# Patient Record
Sex: FEMALE | Race: WHITE | HISPANIC OR LATINO | Employment: OTHER | URBAN - METROPOLITAN AREA
[De-identification: names, ages, dates, MRNs, and addresses within clinical notes are randomized per-mention and may not be internally consistent; named-entity substitution may affect disease eponyms.]

---

## 2017-01-01 ENCOUNTER — APPOINTMENT (INPATIENT)
Dept: RADIOLOGY | Facility: HOSPITAL | Age: 57
DRG: 870 | End: 2017-01-01
Payer: MEDICARE

## 2017-01-01 ENCOUNTER — APPOINTMENT (OUTPATIENT)
Dept: LAB | Facility: HOSPITAL | Age: 57
End: 2017-01-01
Attending: INTERNAL MEDICINE
Payer: MEDICARE

## 2017-01-01 ENCOUNTER — APPOINTMENT (INPATIENT)
Dept: NEUROLOGY | Facility: AMBULATORY SURGERY CENTER | Age: 57
DRG: 870 | End: 2017-01-01
Payer: MEDICARE

## 2017-01-01 ENCOUNTER — GENERIC CONVERSION - ENCOUNTER (OUTPATIENT)
Dept: OTHER | Facility: OTHER | Age: 57
End: 2017-01-01

## 2017-01-01 ENCOUNTER — APPOINTMENT (INPATIENT)
Dept: RADIOLOGY | Facility: HOSPITAL | Age: 57
DRG: 870 | End: 2017-01-01
Attending: INTERNAL MEDICINE
Payer: MEDICARE

## 2017-01-01 ENCOUNTER — ALLSCRIPTS OFFICE VISIT (OUTPATIENT)
Dept: OTHER | Facility: OTHER | Age: 57
End: 2017-01-01

## 2017-01-01 ENCOUNTER — APPOINTMENT (INPATIENT)
Dept: NON INVASIVE DIAGNOSTICS | Facility: HOSPITAL | Age: 57
DRG: 870 | End: 2017-01-01
Payer: MEDICARE

## 2017-01-01 ENCOUNTER — HOSPITAL ENCOUNTER (INPATIENT)
Facility: HOSPITAL | Age: 57
LOS: 9 days | DRG: 870 | End: 2017-03-18
Attending: EMERGENCY MEDICINE | Admitting: EMERGENCY MEDICINE
Payer: MEDICARE

## 2017-01-01 ENCOUNTER — TRANSCRIBE ORDERS (OUTPATIENT)
Dept: LAB | Facility: HOSPITAL | Age: 57
End: 2017-01-01

## 2017-01-01 ENCOUNTER — HOSPITAL ENCOUNTER (INPATIENT)
Dept: RADIOLOGY | Facility: HOSPITAL | Age: 57
Discharge: HOME | DRG: 870 | End: 2017-03-09
Payer: MEDICARE

## 2017-01-01 VITALS
HEART RATE: 98 BPM | TEMPERATURE: 99 F | HEIGHT: 66 IN | OXYGEN SATURATION: 98 % | RESPIRATION RATE: 21 BRPM | SYSTOLIC BLOOD PRESSURE: 189 MMHG | WEIGHT: 268.3 LBS | DIASTOLIC BLOOD PRESSURE: 68 MMHG | BODY MASS INDEX: 43.12 KG/M2

## 2017-01-01 DIAGNOSIS — J96.90 RESPIRATORY FAILURE (HCC): ICD-10-CM

## 2017-01-01 DIAGNOSIS — T14.90XA TRAUMA: ICD-10-CM

## 2017-01-01 DIAGNOSIS — N17.9 AKI (ACUTE KIDNEY INJURY) (HCC): ICD-10-CM

## 2017-01-01 DIAGNOSIS — Z43.0 TRACHEOSTOMY CARE (HCC): ICD-10-CM

## 2017-01-01 DIAGNOSIS — G93.1 ANOXIC ENCEPHALOPATHY (HCC): ICD-10-CM

## 2017-01-01 DIAGNOSIS — N18.4 CHRONIC KIDNEY DISEASE (CKD), STAGE IV (SEVERE) (HCC): ICD-10-CM

## 2017-01-01 DIAGNOSIS — R80.9 PROTEINURIA: ICD-10-CM

## 2017-01-01 DIAGNOSIS — E11.65 TYPE 2 DIABETES MELLITUS WITH HYPERGLYCEMIA (HCC): ICD-10-CM

## 2017-01-01 DIAGNOSIS — G93.40 ENCEPHALOPATHY ACUTE: ICD-10-CM

## 2017-01-01 DIAGNOSIS — N18.30 CHRONIC KIDNEY DISEASE, STAGE III (MODERATE) (HCC): ICD-10-CM

## 2017-01-01 DIAGNOSIS — I46.9 CARDIAC ARREST (HCC): Primary | ICD-10-CM

## 2017-01-01 DIAGNOSIS — N18.4 CHRONIC KIDNEY DISEASE, STAGE IV (SEVERE) (HCC): Chronic | ICD-10-CM

## 2017-01-01 LAB
ALBUMIN SERPL BCP-MCNC: 1.5 G/DL (ref 3.5–5)
ALBUMIN SERPL BCP-MCNC: 1.6 G/DL (ref 3.5–5)
ALBUMIN SERPL BCP-MCNC: 1.6 G/DL (ref 3.5–5)
ALBUMIN SERPL BCP-MCNC: 1.7 G/DL (ref 3.5–5)
ALBUMIN SERPL BCP-MCNC: 1.7 G/DL (ref 3.5–5)
ALBUMIN SERPL BCP-MCNC: 2.5 G/DL (ref 3.5–5)
ALBUMIN SERPL ELPH-MCNC: 3.21 G/DL (ref 3.5–5)
ALBUMIN SERPL ELPH-MCNC: 46.5 % (ref 52–65)
ALBUMIN UR ELPH-MCNC: 68.8 %
ALP SERPL-CCNC: 111 U/L (ref 46–116)
ALP SERPL-CCNC: 112 U/L (ref 46–116)
ALP SERPL-CCNC: 126 U/L (ref 46–116)
ALP SERPL-CCNC: 158 U/L (ref 46–116)
ALP SERPL-CCNC: 94 U/L (ref 46–116)
ALPHA1 GLOB MFR UR ELPH: 5.4 %
ALPHA1 GLOB SERPL ELPH-MCNC: 0.41 G/DL (ref 0.1–0.4)
ALPHA1 GLOB SERPL ELPH-MCNC: 5.9 % (ref 2.5–5)
ALPHA2 GLOB MFR UR ELPH: 7.8 %
ALPHA2 GLOB SERPL ELPH-MCNC: 1.09 G/DL (ref 0.4–1.2)
ALPHA2 GLOB SERPL ELPH-MCNC: 15.8 % (ref 7–13)
ALT SERPL W P-5'-P-CCNC: 11 U/L (ref 12–78)
ALT SERPL W P-5'-P-CCNC: 22 U/L (ref 12–78)
ALT SERPL W P-5'-P-CCNC: 32 U/L (ref 12–78)
ALT SERPL W P-5'-P-CCNC: 45 U/L (ref 12–78)
ALT SERPL W P-5'-P-CCNC: 47 U/L (ref 12–78)
AMMONIA PLAS-SCNC: 42 UMOL/L (ref 11–35)
ANION GAP BLD CALC-SCNC: 21 MMOL/L (ref 4–13)
ANION GAP SERPL CALCULATED.3IONS-SCNC: 10 MMOL/L (ref 4–13)
ANION GAP SERPL CALCULATED.3IONS-SCNC: 10 MMOL/L (ref 4–13)
ANION GAP SERPL CALCULATED.3IONS-SCNC: 11 MMOL/L (ref 4–13)
ANION GAP SERPL CALCULATED.3IONS-SCNC: 12 MMOL/L (ref 4–13)
ANION GAP SERPL CALCULATED.3IONS-SCNC: 13 MMOL/L (ref 4–13)
ANION GAP SERPL CALCULATED.3IONS-SCNC: 15 MMOL/L (ref 4–13)
ANION GAP SERPL CALCULATED.3IONS-SCNC: 15 MMOL/L (ref 4–13)
ANION GAP SERPL CALCULATED.3IONS-SCNC: 18 MMOL/L (ref 4–13)
ANION GAP SERPL CALCULATED.3IONS-SCNC: 8 MMOL/L (ref 4–13)
ANION GAP SERPL CALCULATED.3IONS-SCNC: 9 MMOL/L (ref 4–13)
ANION GAP SERPL CALCULATED.3IONS-SCNC: 9 MMOL/L (ref 4–13)
APAP SERPL-MCNC: 7 UG/ML (ref 10–30)
APTT PPP: 160 SECONDS (ref 24–36)
APTT PPP: 37 SECONDS (ref 24–36)
APTT PPP: 45 SECONDS (ref 24–36)
APTT PPP: 51 SECONDS (ref 24–36)
APTT PPP: 53 SECONDS (ref 24–36)
APTT PPP: 57 SECONDS (ref 24–36)
APTT PPP: 58 SECONDS (ref 24–36)
APTT PPP: 61 SECONDS (ref 24–36)
APTT PPP: 63 SECONDS (ref 24–36)
AST SERPL W P-5'-P-CCNC: 31 U/L (ref 5–45)
AST SERPL W P-5'-P-CCNC: 39 U/L (ref 5–45)
AST SERPL W P-5'-P-CCNC: 53 U/L (ref 5–45)
AST SERPL W P-5'-P-CCNC: 75 U/L (ref 5–45)
AST SERPL W P-5'-P-CCNC: 87 U/L (ref 5–45)
ATRIAL RATE: 120 BPM
ATRIAL RATE: 78 BPM
ATRIAL RATE: 83 BPM
ATRIAL RATE: 84 BPM
ATRIAL RATE: 95 BPM
B-GLOBULIN MFR UR ELPH: 8.2 %
BACTERIA BLD CULT: NORMAL
BACTERIA SPT RESP CULT: NORMAL
BACTERIA SPT RESP CULT: NORMAL
BASE EXCESS BLDA CALC-SCNC: -10 MMOL/L (ref -2–3)
BASE EXCESS BLDA CALC-SCNC: -11.6 MMOL/L
BASE EXCESS BLDA CALC-SCNC: -12 MMOL/L (ref -2–3)
BASE EXCESS BLDA CALC-SCNC: -13 MMOL/L (ref -2–3)
BASE EXCESS BLDA CALC-SCNC: -5.5 MMOL/L
BASE EXCESS BLDA CALC-SCNC: -6.7 MMOL/L
BASE EXCESS BLDA CALC-SCNC: -7 MMOL/L
BASE EXCESS BLDA CALC-SCNC: -8.2 MMOL/L
BASE EXCESS BLDA CALC-SCNC: -8.2 MMOL/L
BASE EXCESS BLDA CALC-SCNC: -9.7 MMOL/L
BASOPHILS # BLD AUTO: 0 THOUSANDS/ΜL (ref 0–0.1)
BASOPHILS # BLD AUTO: 0.01 THOUSANDS/ΜL (ref 0–0.1)
BASOPHILS # BLD AUTO: 0.01 THOUSANDS/ΜL (ref 0–0.1)
BASOPHILS # BLD AUTO: 0.02 THOUSANDS/ΜL (ref 0–0.1)
BASOPHILS # BLD AUTO: 0.2 THOUSANDS/ΜL (ref 0–0.1)
BASOPHILS NFR BLD AUTO: 0 % (ref 0–1)
BASOPHILS NFR BLD AUTO: 1 % (ref 0–1)
BETA GLOB ABNORMAL SERPL ELPH-MCNC: 0.47 G/DL (ref 0.4–0.8)
BETA1 GLOB SERPL ELPH-MCNC: 6.8 % (ref 5–13)
BETA2 GLOB SERPL ELPH-MCNC: 8.4 % (ref 2–8)
BETA2+GAMMA GLOB SERPL ELPH-MCNC: 0.58 G/DL (ref 0.2–0.5)
BILIRUB SERPL-MCNC: 0.2 MG/DL (ref 0.2–1)
BILIRUB SERPL-MCNC: 0.27 MG/DL (ref 0.2–1)
BILIRUB SERPL-MCNC: 0.31 MG/DL (ref 0.2–1)
BILIRUB SERPL-MCNC: 0.4 MG/DL (ref 0.2–1)
BILIRUB SERPL-MCNC: 0.51 MG/DL (ref 0.2–1)
BODY TEMPERATURE: 96.8 DEGREES FEHRENHEIT
BODY TEMPERATURE: 97 DEGREES FEHRENHEIT
BUN BLD-MCNC: 26 MG/DL (ref 5–25)
BUN SERPL-MCNC: 21 MG/DL (ref 5–25)
BUN SERPL-MCNC: 24 MG/DL (ref 5–25)
BUN SERPL-MCNC: 30 MG/DL (ref 5–25)
BUN SERPL-MCNC: 32 MG/DL (ref 5–25)
BUN SERPL-MCNC: 33 MG/DL (ref 5–25)
BUN SERPL-MCNC: 34 MG/DL (ref 5–25)
BUN SERPL-MCNC: 35 MG/DL (ref 5–25)
BUN SERPL-MCNC: 38 MG/DL (ref 5–25)
BUN SERPL-MCNC: 66 MG/DL (ref 5–25)
BUN SERPL-MCNC: 81 MG/DL (ref 5–25)
BUN SERPL-MCNC: 83 MG/DL (ref 5–25)
BUN SERPL-MCNC: 91 MG/DL (ref 5–25)
BUN SERPL-MCNC: 92 MG/DL (ref 5–25)
BUN SERPL-MCNC: 94 MG/DL (ref 5–25)
C DIFF TOX GENS STL QL NAA+PROBE: NORMAL
CA-I BLD-SCNC: 1.09 MMOL/L (ref 1.12–1.32)
CA-I BLD-SCNC: 1.12 MMOL/L (ref 1.12–1.32)
CA-I BLD-SCNC: 1.14 MMOL/L (ref 1.12–1.32)
CA-I BLD-SCNC: 1.14 MMOL/L (ref 1.12–1.32)
CA-I BLD-SCNC: 1.17 MMOL/L (ref 1.12–1.32)
CA-I BLD-SCNC: 1.18 MMOL/L (ref 1.12–1.32)
CA-I BLD-SCNC: 1.2 MMOL/L (ref 1.12–1.32)
CA-I BLD-SCNC: 1.26 MMOL/L (ref 1.12–1.32)
CALCIUM SERPL-MCNC: 7 MG/DL (ref 8.3–10.1)
CALCIUM SERPL-MCNC: 7.4 MG/DL (ref 8.3–10.1)
CALCIUM SERPL-MCNC: 7.6 MG/DL (ref 8.3–10.1)
CALCIUM SERPL-MCNC: 7.7 MG/DL (ref 8.3–10.1)
CALCIUM SERPL-MCNC: 7.8 MG/DL (ref 8.3–10.1)
CALCIUM SERPL-MCNC: 7.8 MG/DL (ref 8.3–10.1)
CALCIUM SERPL-MCNC: 7.9 MG/DL (ref 8.3–10.1)
CALCIUM SERPL-MCNC: 8.1 MG/DL (ref 8.3–10.1)
CALCIUM SERPL-MCNC: 8.6 MG/DL (ref 8.3–10.1)
CALCIUM SERPL-MCNC: 8.7 MG/DL (ref 8.3–10.1)
CALCIUM SERPL-MCNC: 8.8 MG/DL (ref 8.3–10.1)
CALCIUM SERPL-MCNC: 8.8 MG/DL (ref 8.3–10.1)
CHLORIDE BLD-SCNC: 101 MMOL/L (ref 100–108)
CHLORIDE SERPL-SCNC: 102 MMOL/L (ref 100–108)
CHLORIDE SERPL-SCNC: 103 MMOL/L (ref 100–108)
CHLORIDE SERPL-SCNC: 106 MMOL/L (ref 100–108)
CHLORIDE SERPL-SCNC: 107 MMOL/L (ref 100–108)
CHLORIDE SERPL-SCNC: 108 MMOL/L (ref 100–108)
CHLORIDE SERPL-SCNC: 109 MMOL/L (ref 100–108)
CHLORIDE SERPL-SCNC: 109 MMOL/L (ref 100–108)
CHLORIDE SERPL-SCNC: 110 MMOL/L (ref 100–108)
CHLORIDE SERPL-SCNC: 116 MMOL/L (ref 100–108)
CHLORIDE SERPL-SCNC: 122 MMOL/L (ref 100–108)
CHLORIDE SERPL-SCNC: 122 MMOL/L (ref 100–108)
CHOLEST SERPL-MCNC: 143 MG/DL (ref 50–200)
CO2 SERPL-SCNC: 17 MMOL/L (ref 21–32)
CO2 SERPL-SCNC: 18 MMOL/L (ref 21–32)
CO2 SERPL-SCNC: 18 MMOL/L (ref 21–32)
CO2 SERPL-SCNC: 20 MMOL/L (ref 21–32)
CO2 SERPL-SCNC: 21 MMOL/L (ref 21–32)
CO2 SERPL-SCNC: 22 MMOL/L (ref 21–32)
CO2 SERPL-SCNC: 23 MMOL/L (ref 21–32)
CO2 SERPL-SCNC: 23 MMOL/L (ref 21–32)
CO2 SERPL-SCNC: 26 MMOL/L (ref 21–32)
CO2 SERPL-SCNC: 26 MMOL/L (ref 21–32)
CO2 SERPL-SCNC: 27 MMOL/L (ref 21–32)
CO2 SERPL-SCNC: 27 MMOL/L (ref 21–32)
CO2 SERPL-SCNC: 28 MMOL/L (ref 21–32)
CO2 SERPL-SCNC: 28 MMOL/L (ref 21–32)
CREAT BLD-MCNC: 2.1 MG/DL (ref 0.6–1.3)
CREAT SERPL-MCNC: 1.55 MG/DL (ref 0.6–1.3)
CREAT SERPL-MCNC: 2.46 MG/DL (ref 0.6–1.3)
CREAT SERPL-MCNC: 2.47 MG/DL (ref 0.6–1.3)
CREAT SERPL-MCNC: 2.58 MG/DL (ref 0.6–1.3)
CREAT SERPL-MCNC: 2.7 MG/DL (ref 0.6–1.3)
CREAT SERPL-MCNC: 2.74 MG/DL (ref 0.6–1.3)
CREAT SERPL-MCNC: 2.81 MG/DL (ref 0.6–1.3)
CREAT SERPL-MCNC: 2.94 MG/DL (ref 0.6–1.3)
CREAT SERPL-MCNC: 2.95 MG/DL (ref 0.6–1.3)
CREAT SERPL-MCNC: 3.2 MG/DL (ref 0.6–1.3)
CREAT SERPL-MCNC: 3.32 MG/DL (ref 0.6–1.3)
CREAT SERPL-MCNC: 3.59 MG/DL (ref 0.6–1.3)
CREAT SERPL-MCNC: 3.75 MG/DL (ref 0.6–1.3)
CREAT SERPL-MCNC: 3.8 MG/DL (ref 0.6–1.3)
CREAT SERPL-MCNC: 3.94 MG/DL (ref 0.6–1.3)
CREAT SERPL-MCNC: 3.98 MG/DL (ref 0.6–1.3)
CREAT UR-MCNC: 33.3 MG/DL
CREAT UR-MCNC: 46.4 MG/DL
EOSINOPHIL # BLD AUTO: 0 THOUSAND/ΜL (ref 0–0.61)
EOSINOPHIL # BLD AUTO: 0.01 THOUSAND/ΜL (ref 0–0.61)
EOSINOPHIL # BLD AUTO: 0.03 THOUSAND/ΜL (ref 0–0.61)
EOSINOPHIL # BLD AUTO: 0.11 THOUSAND/ΜL (ref 0–0.61)
EOSINOPHIL # BLD AUTO: 0.23 THOUSAND/ΜL (ref 0–0.61)
EOSINOPHIL # BLD AUTO: 0.84 THOUSAND/ΜL (ref 0–0.61)
EOSINOPHIL NFR BLD AUTO: 0 % (ref 0–6)
EOSINOPHIL NFR BLD AUTO: 1 % (ref 0–6)
EOSINOPHIL NFR BLD AUTO: 1 % (ref 0–6)
EOSINOPHIL NFR BLD AUTO: 2 % (ref 0–6)
ERYTHROCYTE [DISTWIDTH] IN BLOOD BY AUTOMATED COUNT: 14.5 % (ref 11.6–15.1)
ERYTHROCYTE [DISTWIDTH] IN BLOOD BY AUTOMATED COUNT: 14.7 % (ref 11.6–15.1)
ERYTHROCYTE [DISTWIDTH] IN BLOOD BY AUTOMATED COUNT: 15.1 % (ref 11.6–15.1)
ERYTHROCYTE [DISTWIDTH] IN BLOOD BY AUTOMATED COUNT: 15.3 % (ref 11.6–15.1)
ERYTHROCYTE [DISTWIDTH] IN BLOOD BY AUTOMATED COUNT: 15.4 % (ref 11.6–15.1)
ERYTHROCYTE [DISTWIDTH] IN BLOOD BY AUTOMATED COUNT: 15.4 % (ref 11.6–15.1)
ERYTHROCYTE [DISTWIDTH] IN BLOOD BY AUTOMATED COUNT: 15.7 % (ref 11.6–15.1)
ERYTHROCYTE [DISTWIDTH] IN BLOOD BY AUTOMATED COUNT: 16.3 % (ref 11.6–15.1)
ERYTHROCYTE [DISTWIDTH] IN BLOOD BY AUTOMATED COUNT: 16.7 % (ref 11.6–15.1)
EST. AVERAGE GLUCOSE BLD GHB EST-MCNC: 275 MG/DL
FIO2 GAS DIL.REBREATH: 100 L
GAMMA GLOB ABNORMAL SERPL ELPH-MCNC: 1.15 G/DL (ref 0.5–1.6)
GAMMA GLOB MFR UR ELPH: 9.8 %
GAMMA GLOB SERPL ELPH-MCNC: 16.6 % (ref 12–22)
GFR SERPL CREATININE-BSD FRML MDRD: 11.6 ML/MIN/1.73SQ M
GFR SERPL CREATININE-BSD FRML MDRD: 11.7 ML/MIN/1.73SQ M
GFR SERPL CREATININE-BSD FRML MDRD: 12.2 ML/MIN/1.73SQ M
GFR SERPL CREATININE-BSD FRML MDRD: 12.4 ML/MIN/1.73SQ M
GFR SERPL CREATININE-BSD FRML MDRD: 13 ML/MIN/1.73SQ M
GFR SERPL CREATININE-BSD FRML MDRD: 14.3 ML/MIN/1.73SQ M
GFR SERPL CREATININE-BSD FRML MDRD: 14.9 ML/MIN/1.73SQ M
GFR SERPL CREATININE-BSD FRML MDRD: 16.3 ML/MIN/1.73SQ M
GFR SERPL CREATININE-BSD FRML MDRD: 16.4 ML/MIN/1.73SQ M
GFR SERPL CREATININE-BSD FRML MDRD: 17.3 ML/MIN/1.73SQ M
GFR SERPL CREATININE-BSD FRML MDRD: 17.8 ML/MIN/1.73SQ M
GFR SERPL CREATININE-BSD FRML MDRD: 18.1 ML/MIN/1.73SQ M
GFR SERPL CREATININE-BSD FRML MDRD: 19.1 ML/MIN/1.73SQ M
GFR SERPL CREATININE-BSD FRML MDRD: 20.1 ML/MIN/1.73SQ M
GFR SERPL CREATININE-BSD FRML MDRD: 20.2 ML/MIN/1.73SQ M
GFR SERPL CREATININE-BSD FRML MDRD: 24.2 ML/MIN/1.73SQ M
GFR SERPL CREATININE-BSD FRML MDRD: 34.3 ML/MIN/1.73SQ M
GLUCOSE SERPL-MCNC: 101 MG/DL (ref 65–140)
GLUCOSE SERPL-MCNC: 107 MG/DL (ref 65–140)
GLUCOSE SERPL-MCNC: 111 MG/DL (ref 65–140)
GLUCOSE SERPL-MCNC: 112 MG/DL (ref 65–140)
GLUCOSE SERPL-MCNC: 112 MG/DL (ref 65–140)
GLUCOSE SERPL-MCNC: 113 MG/DL (ref 65–140)
GLUCOSE SERPL-MCNC: 117 MG/DL (ref 65–140)
GLUCOSE SERPL-MCNC: 118 MG/DL (ref 65–140)
GLUCOSE SERPL-MCNC: 124 MG/DL (ref 65–140)
GLUCOSE SERPL-MCNC: 124 MG/DL (ref 65–140)
GLUCOSE SERPL-MCNC: 126 MG/DL (ref 65–140)
GLUCOSE SERPL-MCNC: 129 MG/DL (ref 65–140)
GLUCOSE SERPL-MCNC: 130 MG/DL (ref 65–140)
GLUCOSE SERPL-MCNC: 130 MG/DL (ref 65–140)
GLUCOSE SERPL-MCNC: 131 MG/DL (ref 65–140)
GLUCOSE SERPL-MCNC: 132 MG/DL (ref 65–140)
GLUCOSE SERPL-MCNC: 133 MG/DL (ref 65–140)
GLUCOSE SERPL-MCNC: 134 MG/DL (ref 65–140)
GLUCOSE SERPL-MCNC: 134 MG/DL (ref 65–140)
GLUCOSE SERPL-MCNC: 139 MG/DL (ref 65–140)
GLUCOSE SERPL-MCNC: 140 MG/DL (ref 65–140)
GLUCOSE SERPL-MCNC: 141 MG/DL (ref 65–140)
GLUCOSE SERPL-MCNC: 142 MG/DL (ref 65–140)
GLUCOSE SERPL-MCNC: 143 MG/DL (ref 65–140)
GLUCOSE SERPL-MCNC: 144 MG/DL (ref 65–140)
GLUCOSE SERPL-MCNC: 144 MG/DL (ref 65–140)
GLUCOSE SERPL-MCNC: 145 MG/DL (ref 65–140)
GLUCOSE SERPL-MCNC: 146 MG/DL (ref 65–140)
GLUCOSE SERPL-MCNC: 147 MG/DL (ref 65–140)
GLUCOSE SERPL-MCNC: 147 MG/DL (ref 65–140)
GLUCOSE SERPL-MCNC: 148 MG/DL (ref 65–140)
GLUCOSE SERPL-MCNC: 148 MG/DL (ref 65–140)
GLUCOSE SERPL-MCNC: 149 MG/DL (ref 65–140)
GLUCOSE SERPL-MCNC: 150 MG/DL (ref 65–140)
GLUCOSE SERPL-MCNC: 152 MG/DL (ref 65–140)
GLUCOSE SERPL-MCNC: 153 MG/DL (ref 65–140)
GLUCOSE SERPL-MCNC: 154 MG/DL (ref 65–140)
GLUCOSE SERPL-MCNC: 154 MG/DL (ref 65–140)
GLUCOSE SERPL-MCNC: 155 MG/DL (ref 65–140)
GLUCOSE SERPL-MCNC: 156 MG/DL (ref 65–140)
GLUCOSE SERPL-MCNC: 157 MG/DL (ref 65–140)
GLUCOSE SERPL-MCNC: 157 MG/DL (ref 65–140)
GLUCOSE SERPL-MCNC: 158 MG/DL (ref 65–140)
GLUCOSE SERPL-MCNC: 159 MG/DL (ref 65–140)
GLUCOSE SERPL-MCNC: 161 MG/DL (ref 65–140)
GLUCOSE SERPL-MCNC: 162 MG/DL (ref 65–140)
GLUCOSE SERPL-MCNC: 163 MG/DL (ref 65–140)
GLUCOSE SERPL-MCNC: 165 MG/DL (ref 65–140)
GLUCOSE SERPL-MCNC: 166 MG/DL (ref 65–140)
GLUCOSE SERPL-MCNC: 166 MG/DL (ref 65–140)
GLUCOSE SERPL-MCNC: 167 MG/DL (ref 65–140)
GLUCOSE SERPL-MCNC: 169 MG/DL (ref 65–140)
GLUCOSE SERPL-MCNC: 170 MG/DL (ref 65–140)
GLUCOSE SERPL-MCNC: 171 MG/DL (ref 65–140)
GLUCOSE SERPL-MCNC: 172 MG/DL (ref 65–140)
GLUCOSE SERPL-MCNC: 173 MG/DL (ref 65–140)
GLUCOSE SERPL-MCNC: 174 MG/DL (ref 65–140)
GLUCOSE SERPL-MCNC: 175 MG/DL (ref 65–140)
GLUCOSE SERPL-MCNC: 176 MG/DL (ref 65–140)
GLUCOSE SERPL-MCNC: 176 MG/DL (ref 65–140)
GLUCOSE SERPL-MCNC: 178 MG/DL (ref 65–140)
GLUCOSE SERPL-MCNC: 183 MG/DL (ref 65–140)
GLUCOSE SERPL-MCNC: 184 MG/DL (ref 65–140)
GLUCOSE SERPL-MCNC: 184 MG/DL (ref 65–140)
GLUCOSE SERPL-MCNC: 185 MG/DL (ref 65–140)
GLUCOSE SERPL-MCNC: 188 MG/DL (ref 65–140)
GLUCOSE SERPL-MCNC: 189 MG/DL (ref 65–140)
GLUCOSE SERPL-MCNC: 192 MG/DL (ref 65–140)
GLUCOSE SERPL-MCNC: 192 MG/DL (ref 65–140)
GLUCOSE SERPL-MCNC: 205 MG/DL (ref 65–140)
GLUCOSE SERPL-MCNC: 209 MG/DL (ref 65–140)
GLUCOSE SERPL-MCNC: 213 MG/DL (ref 65–140)
GLUCOSE SERPL-MCNC: 217 MG/DL (ref 65–140)
GLUCOSE SERPL-MCNC: 217 MG/DL (ref 65–140)
GLUCOSE SERPL-MCNC: 225 MG/DL (ref 65–140)
GLUCOSE SERPL-MCNC: 227 MG/DL (ref 65–140)
GLUCOSE SERPL-MCNC: 233 MG/DL (ref 65–140)
GLUCOSE SERPL-MCNC: 242 MG/DL (ref 65–140)
GLUCOSE SERPL-MCNC: 250 MG/DL (ref 65–140)
GLUCOSE SERPL-MCNC: 253 MG/DL (ref 65–140)
GLUCOSE SERPL-MCNC: 260 MG/DL (ref 65–140)
GLUCOSE SERPL-MCNC: 273 MG/DL (ref 65–140)
GLUCOSE SERPL-MCNC: 281 MG/DL (ref 65–140)
GLUCOSE SERPL-MCNC: 281 MG/DL (ref 65–140)
GLUCOSE SERPL-MCNC: 302 MG/DL (ref 65–140)
GLUCOSE SERPL-MCNC: 304 MG/DL (ref 65–140)
GLUCOSE SERPL-MCNC: 310 MG/DL (ref 65–140)
GLUCOSE SERPL-MCNC: 347 MG/DL (ref 65–140)
GLUCOSE SERPL-MCNC: 358 MG/DL (ref 65–140)
GLUCOSE SERPL-MCNC: 386 MG/DL (ref 65–140)
GLUCOSE SERPL-MCNC: 389 MG/DL (ref 65–140)
GLUCOSE SERPL-MCNC: 394 MG/DL (ref 65–140)
GLUCOSE SERPL-MCNC: 398 MG/DL (ref 65–140)
GLUCOSE SERPL-MCNC: 401 MG/DL (ref 65–140)
GLUCOSE SERPL-MCNC: 405 MG/DL (ref 65–140)
GLUCOSE SERPL-MCNC: 455 MG/DL (ref 65–140)
GLUCOSE SERPL-MCNC: 481 MG/DL (ref 65–140)
GLUCOSE SERPL-MCNC: 531 MG/DL (ref 65–140)
GLUCOSE SERPL-MCNC: 548 MG/DL (ref 65–140)
GLUCOSE SERPL-MCNC: 560 MG/DL (ref 65–140)
GLUCOSE SERPL-MCNC: 71 MG/DL (ref 65–140)
GLUCOSE SERPL-MCNC: 81 MG/DL (ref 65–140)
GLUCOSE SERPL-MCNC: 86 MG/DL (ref 65–140)
GLUCOSE SERPL-MCNC: 97 MG/DL (ref 65–140)
GLUCOSE SERPL-MCNC: 98 MG/DL (ref 65–140)
GLUCOSE SERPL-MCNC: 99 MG/DL (ref 65–140)
GLUCOSE SERPL-MCNC: >500 MG/DL (ref 65–140)
GRAM STN SPEC: NORMAL
HAV IGM SER QL: NORMAL
HBA1C MFR BLD: 11.2 % (ref 4.2–6.3)
HBV CORE IGM SER QL: NORMAL
HBV SURFACE AG SER QL: NORMAL
HCO3 BLDA-SCNC: 15.8 MMOL/L (ref 22–28)
HCO3 BLDA-SCNC: 16.3 MMOL/L (ref 22–28)
HCO3 BLDA-SCNC: 16.5 MMOL/L (ref 24–30)
HCO3 BLDA-SCNC: 17.3 MMOL/L (ref 22–28)
HCO3 BLDA-SCNC: 18.3 MMOL/L (ref 24–30)
HCO3 BLDA-SCNC: 18.5 MMOL/L (ref 22–28)
HCO3 BLDA-SCNC: 18.7 MMOL/L (ref 22–28)
HCO3 BLDA-SCNC: 18.9 MMOL/L (ref 22–28)
HCO3 BLDA-SCNC: 19.2 MMOL/L (ref 22–28)
HCO3 BLDA-SCNC: 20.3 MMOL/L (ref 22–28)
HCT VFR BLD AUTO: 26.2 % (ref 34.8–46.1)
HCT VFR BLD AUTO: 26.8 % (ref 34.8–46.1)
HCT VFR BLD AUTO: 27.2 % (ref 34.8–46.1)
HCT VFR BLD AUTO: 28 % (ref 34.8–46.1)
HCT VFR BLD AUTO: 28.2 % (ref 34.8–46.1)
HCT VFR BLD AUTO: 29.7 % (ref 34.8–46.1)
HCT VFR BLD AUTO: 29.9 % (ref 34.8–46.1)
HCT VFR BLD AUTO: 32.2 % (ref 34.8–46.1)
HCT VFR BLD AUTO: 33.1 % (ref 34.8–46.1)
HCT VFR BLD CALC: 29 % (ref 34.8–46.1)
HCT VFR BLD CALC: 30 % (ref 34.8–46.1)
HCT VFR BLD CALC: 31 % (ref 34.8–46.1)
HCT VFR BLD CALC: 32 % (ref 34.8–46.1)
HCV AB SER QL: NORMAL
HDLC SERPL-MCNC: 24 MG/DL (ref 40–60)
HGB BLD-MCNC: 10 G/DL (ref 11.5–15.4)
HGB BLD-MCNC: 10.4 G/DL (ref 11.5–15.4)
HGB BLD-MCNC: 11.2 G/DL (ref 11.5–15.4)
HGB BLD-MCNC: 8.8 G/DL (ref 11.5–15.4)
HGB BLD-MCNC: 9 G/DL (ref 11.5–15.4)
HGB BLD-MCNC: 9.1 G/DL (ref 11.5–15.4)
HGB BLD-MCNC: 9.3 G/DL (ref 11.5–15.4)
HGB BLD-MCNC: 9.4 G/DL (ref 11.5–15.4)
HGB BLD-MCNC: 9.7 G/DL (ref 11.5–15.4)
HGB BLDA-MCNC: 10.2 G/DL (ref 11.5–15.4)
HGB BLDA-MCNC: 10.5 G/DL (ref 11.5–15.4)
HGB BLDA-MCNC: 10.9 G/DL (ref 11.5–15.4)
HGB BLDA-MCNC: 9.9 G/DL (ref 11.5–15.4)
HOROWITZ INDEX BLDA+IHG-RTO: 100 MM[HG]
HOROWITZ INDEX BLDA+IHG-RTO: 100 MM[HG]
HOROWITZ INDEX BLDA+IHG-RTO: 60 MM[HG]
IGG/ALB SER: 0.87 {RATIO} (ref 1.1–1.8)
INR PPP: 1.09 (ref 0.86–1.16)
INR PPP: 1.12 (ref 0.86–1.16)
INR PPP: 1.13 (ref 0.86–1.16)
INR PPP: 1.17 (ref 0.86–1.16)
INR PPP: 1.19 (ref 0.86–1.16)
INR PPP: 1.21 (ref 0.86–1.16)
INR PPP: 1.29 (ref 0.86–1.16)
LACTATE SERPL-SCNC: 1.7 MMOL/L (ref 0.5–2)
LACTATE SERPL-SCNC: 2.1 MMOL/L (ref 0.5–2)
LACTATE SERPL-SCNC: 2.1 MMOL/L (ref 0.5–2)
LACTATE SERPL-SCNC: 2.2 MMOL/L (ref 0.5–2)
LACTATE SERPL-SCNC: 2.5 MMOL/L (ref 0.5–2)
LACTATE SERPL-SCNC: 3 MMOL/L (ref 0.5–2)
LACTATE SERPL-SCNC: 9.2 MMOL/L (ref 0.5–2)
LDLC SERPL DIRECT ASSAY-MCNC: 46 MG/DL (ref 0–100)
LYMPHOCYTES # BLD AUTO: 1.08 THOUSANDS/ΜL (ref 0.6–4.47)
LYMPHOCYTES # BLD AUTO: 1.22 THOUSANDS/ΜL (ref 0.6–4.47)
LYMPHOCYTES # BLD AUTO: 1.28 THOUSANDS/ΜL (ref 0.6–4.47)
LYMPHOCYTES # BLD AUTO: 1.33 THOUSANDS/ΜL (ref 0.6–4.47)
LYMPHOCYTES # BLD AUTO: 1.56 THOUSANDS/ΜL (ref 0.6–4.47)
LYMPHOCYTES # BLD AUTO: 1.73 THOUSANDS/ΜL (ref 0.6–4.47)
LYMPHOCYTES # BLD AUTO: 11.34 THOUSANDS/ΜL (ref 0.6–4.47)
LYMPHOCYTES # BLD AUTO: 2.2 THOUSANDS/ΜL (ref 0.6–4.47)
LYMPHOCYTES NFR BLD AUTO: 12 % (ref 14–44)
LYMPHOCYTES NFR BLD AUTO: 14 % (ref 14–44)
LYMPHOCYTES NFR BLD AUTO: 30 % (ref 14–44)
LYMPHOCYTES NFR BLD AUTO: 6 % (ref 14–44)
LYMPHOCYTES NFR BLD AUTO: 7 % (ref 14–44)
LYMPHOCYTES NFR BLD AUTO: 8 % (ref 14–44)
LYMPHOCYTES NFR BLD AUTO: 8 % (ref 14–44)
LYMPHOCYTES NFR BLD AUTO: 9 % (ref 14–44)
MAGNESIUM SERPL-MCNC: 1.7 MG/DL (ref 1.6–2.6)
MAGNESIUM SERPL-MCNC: 2.1 MG/DL (ref 1.6–2.6)
MAGNESIUM SERPL-MCNC: 2.2 MG/DL (ref 1.6–2.6)
MAGNESIUM SERPL-MCNC: 2.2 MG/DL (ref 1.6–2.6)
MAGNESIUM SERPL-MCNC: 2.3 MG/DL (ref 1.6–2.6)
MAGNESIUM SERPL-MCNC: 2.3 MG/DL (ref 1.6–2.6)
MAGNESIUM SERPL-MCNC: 2.4 MG/DL (ref 1.6–2.6)
MAGNESIUM SERPL-MCNC: 2.7 MG/DL (ref 1.6–2.6)
MAGNESIUM SERPL-MCNC: 2.8 MG/DL (ref 1.6–2.6)
MCH RBC QN AUTO: 25.1 PG (ref 26.8–34.3)
MCH RBC QN AUTO: 25.6 PG (ref 26.8–34.3)
MCH RBC QN AUTO: 25.6 PG (ref 26.8–34.3)
MCH RBC QN AUTO: 25.7 PG (ref 26.8–34.3)
MCH RBC QN AUTO: 25.7 PG (ref 26.8–34.3)
MCH RBC QN AUTO: 26 PG (ref 26.8–34.3)
MCH RBC QN AUTO: 26 PG (ref 26.8–34.3)
MCH RBC QN AUTO: 26.1 PG (ref 26.8–34.3)
MCH RBC QN AUTO: 26.1 PG (ref 26.8–34.3)
MCHC RBC AUTO-ENTMCNC: 32.3 G/DL (ref 31.4–37.4)
MCHC RBC AUTO-ENTMCNC: 32.4 G/DL (ref 31.4–37.4)
MCHC RBC AUTO-ENTMCNC: 33.1 G/DL (ref 31.4–37.4)
MCHC RBC AUTO-ENTMCNC: 33.2 G/DL (ref 31.4–37.4)
MCHC RBC AUTO-ENTMCNC: 33.3 G/DL (ref 31.4–37.4)
MCHC RBC AUTO-ENTMCNC: 33.6 G/DL (ref 31.4–37.4)
MCHC RBC AUTO-ENTMCNC: 33.7 G/DL (ref 31.4–37.4)
MCHC RBC AUTO-ENTMCNC: 33.8 G/DL (ref 31.4–37.4)
MCHC RBC AUTO-ENTMCNC: 34 G/DL (ref 31.4–37.4)
MCV RBC AUTO: 76 FL (ref 82–98)
MCV RBC AUTO: 77 FL (ref 82–98)
MCV RBC AUTO: 77 FL (ref 82–98)
MCV RBC AUTO: 79 FL (ref 82–98)
MCV RBC AUTO: 80 FL (ref 82–98)
MCV RBC AUTO: 81 FL (ref 82–98)
MONOCYTES # BLD AUTO: 1.17 THOUSAND/ΜL (ref 0.17–1.22)
MONOCYTES # BLD AUTO: 1.29 THOUSAND/ΜL (ref 0.17–1.22)
MONOCYTES # BLD AUTO: 1.33 THOUSAND/ΜL (ref 0.17–1.22)
MONOCYTES # BLD AUTO: 1.43 THOUSAND/ΜL (ref 0.17–1.22)
MONOCYTES # BLD AUTO: 1.46 THOUSAND/ΜL (ref 0.17–1.22)
MONOCYTES # BLD AUTO: 1.72 THOUSAND/ΜL (ref 0.17–1.22)
MONOCYTES # BLD AUTO: 2.12 THOUSAND/ΜL (ref 0.17–1.22)
MONOCYTES # BLD AUTO: 2.2 THOUSAND/ΜL (ref 0.17–1.22)
MONOCYTES NFR BLD AUTO: 10 % (ref 4–12)
MONOCYTES NFR BLD AUTO: 10 % (ref 4–12)
MONOCYTES NFR BLD AUTO: 12 % (ref 4–12)
MONOCYTES NFR BLD AUTO: 12 % (ref 4–12)
MONOCYTES NFR BLD AUTO: 5 % (ref 4–12)
MONOCYTES NFR BLD AUTO: 7 % (ref 4–12)
MONOCYTES NFR BLD AUTO: 8 % (ref 4–12)
MONOCYTES NFR BLD AUTO: 8 % (ref 4–12)
NEUTROPHILS # BLD AUTO: 12.67 THOUSANDS/ΜL (ref 1.85–7.62)
NEUTROPHILS # BLD AUTO: 13.65 THOUSANDS/ΜL (ref 1.85–7.62)
NEUTROPHILS # BLD AUTO: 14.94 THOUSANDS/ΜL (ref 1.85–7.62)
NEUTROPHILS # BLD AUTO: 16.23 THOUSANDS/ΜL (ref 1.85–7.62)
NEUTROPHILS # BLD AUTO: 18.46 THOUSANDS/ΜL (ref 1.85–7.62)
NEUTROPHILS # BLD AUTO: 21.54 THOUSANDS/ΜL (ref 1.85–7.62)
NEUTROPHILS # BLD AUTO: 8.82 THOUSANDS/ΜL (ref 1.85–7.62)
NEUTROPHILS # BLD AUTO: 9.74 THOUSANDS/ΜL (ref 1.85–7.62)
NEUTS SEG NFR BLD AUTO: 62 % (ref 43–75)
NEUTS SEG NFR BLD AUTO: 73 % (ref 43–75)
NEUTS SEG NFR BLD AUTO: 75 % (ref 43–75)
NEUTS SEG NFR BLD AUTO: 81 % (ref 43–75)
NEUTS SEG NFR BLD AUTO: 84 % (ref 43–75)
NEUTS SEG NFR BLD AUTO: 84 % (ref 43–75)
NEUTS SEG NFR BLD AUTO: 85 % (ref 43–75)
NEUTS SEG NFR BLD AUTO: 85 % (ref 43–75)
NRBC BLD AUTO-RTO: 0 /100 WBCS
NT-PROBNP SERPL-MCNC: 898 PG/ML
O2 CT BLDA-SCNC: 14.3 ML/DL (ref 16–23)
O2 CT BLDA-SCNC: 14.5 ML/DL (ref 16–23)
O2 CT BLDA-SCNC: 14.6 ML/DL (ref 16–23)
O2 CT BLDA-SCNC: 15.2 ML/DL (ref 16–23)
O2 CT BLDA-SCNC: 15.3 ML/DL (ref 16–23)
O2 CT BLDA-SCNC: 16.3 ML/DL (ref 16–23)
O2 CT BLDA-SCNC: 16.8 ML/DL (ref 16–23)
OXYHGB MFR BLDA: 92.5 % (ref 94–97)
OXYHGB MFR BLDA: 92.9 % (ref 94–97)
OXYHGB MFR BLDA: 94.1 % (ref 94–97)
OXYHGB MFR BLDA: 94.1 % (ref 94–97)
OXYHGB MFR BLDA: 94.6 % (ref 94–97)
OXYHGB MFR BLDA: 97.3 % (ref 94–97)
OXYHGB MFR BLDA: 97.5 % (ref 94–97)
P AXIS: -17 DEGREES
P AXIS: 59 DEGREES
P AXIS: 65 DEGREES
P AXIS: 75 DEGREES
P AXIS: 75 DEGREES
PCO2 BLD: 16 MMOL/L (ref 21–32)
PCO2 BLD: 18 MMOL/L (ref 21–32)
PCO2 BLD: 20 MMOL/L (ref 21–32)
PCO2 BLD: 21 MMOL/L (ref 21–32)
PCO2 BLD: 49.3 MM HG (ref 42–50)
PCO2 BLD: 52.8 MM HG (ref 36–44)
PCO2 BLD: 74.9 MM HG (ref 42–50)
PCO2 BLDA: 28.1 MM HG (ref 36–44)
PCO2 BLDA: 38.2 MM HG (ref 36–44)
PCO2 BLDA: 40.6 MM HG (ref 36–44)
PCO2 BLDA: 41.5 MM HG (ref 36–44)
PCO2 BLDA: 42 MM HG (ref 36–44)
PCO2 BLDA: 43.8 MM HG (ref 36–44)
PCO2 BLDA: 44 MM HG (ref 36–44)
PCO2 TEMP ADJ BLDA: 26.9 MM HG (ref 36–44)
PCO2 TEMP ADJ BLDA: 39.9 MM HG (ref 36–44)
PEEP RESPIRATORY: 10 CM[H2O]
PEEP RESPIRATORY: 10 CM[H2O]
PEEP RESPIRATORY: 5 CM[H2O]
PEEP RESPIRATORY: 8 CM[H2O]
PH BLD: 7 [PH] (ref 7.3–7.4)
PH BLD: 7.13 [PH] (ref 7.3–7.4)
PH BLD: 7.15 [PH] (ref 7.35–7.45)
PH BLD: 7.3 [PH] (ref 7.35–7.45)
PH BLD: 7.38 [PH] (ref 7.35–7.45)
PH BLDA: 7.19 [PH] (ref 7.35–7.45)
PH BLDA: 7.23 [PH] (ref 7.35–7.45)
PH BLDA: 7.25 [PH] (ref 7.35–7.45)
PH BLDA: 7.28 [PH] (ref 7.35–7.45)
PH BLDA: 7.31 [PH] (ref 7.35–7.45)
PH BLDA: 7.32 [PH] (ref 7.35–7.45)
PH BLDA: 7.37 [PH] (ref 7.35–7.45)
PHOSPHATE SERPL-MCNC: 2.1 MG/DL (ref 2.7–4.5)
PHOSPHATE SERPL-MCNC: 2.4 MG/DL (ref 2.7–4.5)
PHOSPHATE SERPL-MCNC: 2.8 MG/DL (ref 2.7–4.5)
PHOSPHATE SERPL-MCNC: 4 MG/DL (ref 2.7–4.5)
PHOSPHATE SERPL-MCNC: 4.1 MG/DL (ref 2.7–4.5)
PHOSPHATE SERPL-MCNC: 5.4 MG/DL (ref 2.7–4.5)
PHOSPHATE SERPL-MCNC: 5.5 MG/DL (ref 2.7–4.5)
PHOSPHATE SERPL-MCNC: 6.6 MG/DL (ref 2.7–4.5)
PHOSPHATE SERPL-MCNC: 7 MG/DL (ref 2.7–4.5)
PHOSPHATE SERPL-MCNC: 8.8 MG/DL (ref 2.7–4.5)
PLATELET # BLD AUTO: 275 THOUSANDS/UL (ref 149–390)
PLATELET # BLD AUTO: 275 THOUSANDS/UL (ref 149–390)
PLATELET # BLD AUTO: 282 THOUSANDS/UL (ref 149–390)
PLATELET # BLD AUTO: 288 THOUSANDS/UL (ref 149–390)
PLATELET # BLD AUTO: 293 THOUSANDS/UL (ref 149–390)
PLATELET # BLD AUTO: 300 THOUSANDS/UL (ref 149–390)
PLATELET # BLD AUTO: 358 THOUSANDS/UL (ref 149–390)
PLATELET # BLD AUTO: 360 THOUSANDS/UL (ref 149–390)
PLATELET # BLD AUTO: 361 THOUSANDS/UL (ref 149–390)
PLATELET # BLD AUTO: 379 THOUSANDS/UL (ref 149–390)
PLATELET # BLD AUTO: 452 THOUSANDS/UL (ref 149–390)
PMV BLD AUTO: 10.5 FL (ref 8.9–12.7)
PMV BLD AUTO: 10.6 FL (ref 8.9–12.7)
PMV BLD AUTO: 10.7 FL (ref 8.9–12.7)
PMV BLD AUTO: 10.8 FL (ref 8.9–12.7)
PMV BLD AUTO: 10.9 FL (ref 8.9–12.7)
PMV BLD AUTO: 11 FL (ref 8.9–12.7)
PMV BLD AUTO: 11 FL (ref 8.9–12.7)
PMV BLD AUTO: 11.1 FL (ref 8.9–12.7)
PMV BLD AUTO: 11.1 FL (ref 8.9–12.7)
PMV BLD AUTO: 11.2 FL (ref 8.9–12.7)
PMV BLD AUTO: 11.2 FL (ref 8.9–12.7)
PO2 BLD: 105 MM HG (ref 75–129)
PO2 BLD: 110.9 MM HG (ref 75–129)
PO2 BLD: 193 MM HG (ref 35–45)
PO2 BLD: 38 MM HG (ref 35–45)
PO2 BLD: 73.5 MM HG (ref 75–129)
PO2 BLDA: 117 MM HG (ref 75–129)
PO2 BLDA: 153.2 MM HG (ref 75–129)
PO2 BLDA: 78 MM HG (ref 75–129)
PO2 BLDA: 81.5 MM HG (ref 75–129)
PO2 BLDA: 84.3 MM HG (ref 75–129)
PO2 BLDA: 84.7 MM HG (ref 75–129)
PO2 BLDA: 91.7 MM HG (ref 75–129)
POTASSIUM BLD-SCNC: 3.5 MMOL/L (ref 3.5–5.3)
POTASSIUM BLD-SCNC: 4.7 MMOL/L (ref 3.5–5.3)
POTASSIUM BLD-SCNC: 4.7 MMOL/L (ref 3.5–5.3)
POTASSIUM BLD-SCNC: 4.9 MMOL/L (ref 3.5–5.3)
POTASSIUM SERPL-SCNC: 3 MMOL/L (ref 3.5–5.3)
POTASSIUM SERPL-SCNC: 3.1 MMOL/L (ref 3.5–5.3)
POTASSIUM SERPL-SCNC: 3.4 MMOL/L (ref 3.5–5.3)
POTASSIUM SERPL-SCNC: 3.8 MMOL/L (ref 3.5–5.3)
POTASSIUM SERPL-SCNC: 4 MMOL/L (ref 3.5–5.3)
POTASSIUM SERPL-SCNC: 4.2 MMOL/L (ref 3.5–5.3)
POTASSIUM SERPL-SCNC: 4.4 MMOL/L (ref 3.5–5.3)
POTASSIUM SERPL-SCNC: 4.4 MMOL/L (ref 3.5–5.3)
POTASSIUM SERPL-SCNC: 4.5 MMOL/L (ref 3.5–5.3)
POTASSIUM SERPL-SCNC: 4.5 MMOL/L (ref 3.5–5.3)
POTASSIUM SERPL-SCNC: 4.6 MMOL/L (ref 3.5–5.3)
POTASSIUM SERPL-SCNC: 4.6 MMOL/L (ref 3.5–5.3)
POTASSIUM SERPL-SCNC: 4.7 MMOL/L (ref 3.5–5.3)
POTASSIUM SERPL-SCNC: 4.7 MMOL/L (ref 3.5–5.3)
POTASSIUM SERPL-SCNC: 5.5 MMOL/L (ref 3.5–5.3)
POTASSIUM SERPL-SCNC: 6 MMOL/L (ref 3.5–5.3)
PR INTERVAL: 167 MS
PR INTERVAL: 175 MS
PR INTERVAL: 179 MS
PR INTERVAL: 179 MS
PR INTERVAL: 192 MS
PROLACTIN SERPL-MCNC: 86.2 NG/ML
PROT PATTERN SERPL ELPH-IMP: ABNORMAL
PROT PATTERN UR ELPH-IMP: ABNORMAL
PROT SERPL-MCNC: 4.8 G/DL (ref 6.4–8.2)
PROT SERPL-MCNC: 5.1 G/DL (ref 6.4–8.2)
PROT SERPL-MCNC: 6.1 G/DL (ref 6.4–8.2)
PROT SERPL-MCNC: 6.6 G/DL (ref 6.4–8.2)
PROT SERPL-MCNC: 6.8 G/DL (ref 6.4–8.2)
PROT SERPL-MCNC: 6.9 G/DL (ref 6.4–8.2)
PROT UR-MCNC: 369 MG/DL
PROT UR-MCNC: 394 MG/DL
PROT UR-MCNC: 397 MG/DL
PROT/CREAT UR: 11.08 MG/G{CREAT} (ref 0–0.1)
PROT/CREAT UR: 8.56 MG/G{CREAT} (ref 0–0.1)
PROTHROMBIN TIME: 14.2 SECONDS (ref 12–14.3)
PROTHROMBIN TIME: 14.5 SECONDS (ref 12–14.3)
PROTHROMBIN TIME: 14.6 SECONDS (ref 12–14.3)
PROTHROMBIN TIME: 15 SECONDS (ref 12–14.3)
PROTHROMBIN TIME: 15.2 SECONDS (ref 12–14.3)
PROTHROMBIN TIME: 15.4 SECONDS (ref 12–14.3)
PROTHROMBIN TIME: 16.1 SECONDS (ref 12–14.3)
QRS AXIS: 101 DEGREES
QRS AXIS: 102 DEGREES
QRS AXIS: 105 DEGREES
QRS AXIS: 80 DEGREES
QRS AXIS: 87 DEGREES
QRSD INTERVAL: 100 MS
QRSD INTERVAL: 100 MS
QRSD INTERVAL: 104 MS
QRSD INTERVAL: 116 MS
QRSD INTERVAL: 96 MS
QT INTERVAL: 332 MS
QT INTERVAL: 383 MS
QT INTERVAL: 408 MS
QT INTERVAL: 454 MS
QT INTERVAL: 529 MS
QTC INTERVAL: 469 MS
QTC INTERVAL: 481 MS
QTC INTERVAL: 482 MS
QTC INTERVAL: 533 MS
QTC INTERVAL: 603 MS
RBC # BLD AUTO: 3.43 MILLION/UL (ref 3.81–5.12)
RBC # BLD AUTO: 3.55 MILLION/UL (ref 3.81–5.12)
RBC # BLD AUTO: 3.56 MILLION/UL (ref 3.81–5.12)
RBC # BLD AUTO: 3.58 MILLION/UL (ref 3.81–5.12)
RBC # BLD AUTO: 3.67 MILLION/UL (ref 3.81–5.12)
RBC # BLD AUTO: 3.72 MILLION/UL (ref 3.81–5.12)
RBC # BLD AUTO: 3.89 MILLION/UL (ref 3.81–5.12)
RBC # BLD AUTO: 4 MILLION/UL (ref 3.81–5.12)
RBC # BLD AUTO: 4.3 MILLION/UL (ref 3.81–5.12)
RYE IGE QN: NEGATIVE
SALICYLATES SERPL-MCNC: <3 MG/DL (ref 3–20)
SAO2 % BLD FROM PO2: 45 % (ref 95–98)
SAO2 % BLD FROM PO2: 96 % (ref 95–98)
SAO2 % BLD FROM PO2: 99 % (ref 95–98)
SODIUM BLD-SCNC: 131 MMOL/L (ref 136–145)
SODIUM BLD-SCNC: 133 MMOL/L (ref 136–145)
SODIUM BLD-SCNC: 133 MMOL/L (ref 136–145)
SODIUM BLD-SCNC: 135 MMOL/L (ref 136–145)
SODIUM SERPL-SCNC: 136 MMOL/L (ref 136–145)
SODIUM SERPL-SCNC: 138 MMOL/L (ref 136–145)
SODIUM SERPL-SCNC: 138 MMOL/L (ref 136–145)
SODIUM SERPL-SCNC: 139 MMOL/L (ref 136–145)
SODIUM SERPL-SCNC: 139 MMOL/L (ref 136–145)
SODIUM SERPL-SCNC: 141 MMOL/L (ref 136–145)
SODIUM SERPL-SCNC: 142 MMOL/L (ref 136–145)
SODIUM SERPL-SCNC: 144 MMOL/L (ref 136–145)
SODIUM SERPL-SCNC: 147 MMOL/L (ref 136–145)
SODIUM SERPL-SCNC: 153 MMOL/L (ref 136–145)
SODIUM SERPL-SCNC: 158 MMOL/L (ref 136–145)
SODIUM SERPL-SCNC: 158 MMOL/L (ref 136–145)
SPECIMEN SOURCE: ABNORMAL
T WAVE AXIS: -70 DEGREES
T WAVE AXIS: 119 DEGREES
T WAVE AXIS: 142 DEGREES
T WAVE AXIS: 92 DEGREES
T WAVE AXIS: 95 DEGREES
TRIGL SERPL-MCNC: 498 MG/DL
TROPONIN I BLD-MCNC: 0.15 NG/ML (ref 0–0.08)
TROPONIN I SERPL-MCNC: 0.13 NG/ML
TROPONIN I SERPL-MCNC: 0.13 NG/ML
TROPONIN I SERPL-MCNC: 0.93 NG/ML
TROPONIN I SERPL-MCNC: 26.6 NG/ML
TROPONIN I SERPL-MCNC: 37 NG/ML
TROPONIN I SERPL-MCNC: >40 NG/ML
VANCOMYCIN TROUGH SERPL-MCNC: 19.8 UG/ML (ref 10–20)
VENT AC: 20
VENT AC: 26
VENT AC: 28
VENT AC: 30
VENT- AC: AC
VENTRICULAR RATE: 120 BPM
VENTRICULAR RATE: 78 BPM
VENTRICULAR RATE: 83 BPM
VENTRICULAR RATE: 84 BPM
VENTRICULAR RATE: 95 BPM
VT SETTING VENT: 400 ML
VT SETTING VENT: 420 ML
VT SETTING VENT: 450 ML
WBC # BLD AUTO: 12.17 THOUSAND/UL (ref 4.31–10.16)
WBC # BLD AUTO: 12.32 THOUSAND/UL (ref 4.31–10.16)
WBC # BLD AUTO: 15.34 THOUSAND/UL (ref 4.31–10.16)
WBC # BLD AUTO: 17.77 THOUSAND/UL (ref 4.31–10.16)
WBC # BLD AUTO: 18.52 THOUSAND/UL (ref 4.31–10.16)
WBC # BLD AUTO: 18.61 THOUSAND/UL (ref 4.31–10.16)
WBC # BLD AUTO: 19.29 THOUSAND/UL (ref 4.31–10.16)
WBC # BLD AUTO: 22.05 THOUSAND/UL (ref 4.31–10.16)
WBC # BLD AUTO: 37.83 THOUSAND/UL (ref 4.31–10.16)

## 2017-01-01 PROCEDURE — 80048 BASIC METABOLIC PNL TOTAL CA: CPT | Performed by: INTERNAL MEDICINE

## 2017-01-01 PROCEDURE — 82570 ASSAY OF URINE CREATININE: CPT

## 2017-01-01 PROCEDURE — C9113 INJ PANTOPRAZOLE SODIUM, VIA: HCPCS | Performed by: INTERNAL MEDICINE

## 2017-01-01 PROCEDURE — 85025 COMPLETE CBC W/AUTO DIFF WBC: CPT | Performed by: EMERGENCY MEDICINE

## 2017-01-01 PROCEDURE — 80053 COMPREHEN METABOLIC PANEL: CPT | Performed by: EMERGENCY MEDICINE

## 2017-01-01 PROCEDURE — 71010 HB CHEST X-RAY 1 VIEW FRONTAL (PORTABLE): CPT

## 2017-01-01 PROCEDURE — 93005 ELECTROCARDIOGRAM TRACING: CPT | Performed by: EMERGENCY MEDICINE

## 2017-01-01 PROCEDURE — 36415 COLL VENOUS BLD VENIPUNCTURE: CPT

## 2017-01-01 PROCEDURE — 80074 ACUTE HEPATITIS PANEL: CPT

## 2017-01-01 PROCEDURE — 84295 ASSAY OF SERUM SODIUM: CPT

## 2017-01-01 PROCEDURE — 94760 N-INVAS EAR/PLS OXIMETRY 1: CPT

## 2017-01-01 PROCEDURE — 83605 ASSAY OF LACTIC ACID: CPT | Performed by: NURSE PRACTITIONER

## 2017-01-01 PROCEDURE — 80329 ANALGESICS NON-OPIOID 1 OR 2: CPT | Performed by: EMERGENCY MEDICINE

## 2017-01-01 PROCEDURE — 84166 PROTEIN E-PHORESIS/URINE/CSF: CPT

## 2017-01-01 PROCEDURE — 84100 ASSAY OF PHOSPHORUS: CPT | Performed by: EMERGENCY MEDICINE

## 2017-01-01 PROCEDURE — 95951 HB EEG MONITORING/VIDEORECORD: CPT

## 2017-01-01 PROCEDURE — 96375 TX/PRO/DX INJ NEW DRUG ADDON: CPT

## 2017-01-01 PROCEDURE — 80053 COMPREHEN METABOLIC PANEL: CPT | Performed by: INTERNAL MEDICINE

## 2017-01-01 PROCEDURE — 80048 BASIC METABOLIC PNL TOTAL CA: CPT | Performed by: EMERGENCY MEDICINE

## 2017-01-01 PROCEDURE — 84132 ASSAY OF SERUM POTASSIUM: CPT

## 2017-01-01 PROCEDURE — 82948 REAGENT STRIP/BLOOD GLUCOSE: CPT

## 2017-01-01 PROCEDURE — 87070 CULTURE OTHR SPECIMN AEROBIC: CPT | Performed by: EMERGENCY MEDICINE

## 2017-01-01 PROCEDURE — 84484 ASSAY OF TROPONIN QUANT: CPT | Performed by: INTERNAL MEDICINE

## 2017-01-01 PROCEDURE — 80202 ASSAY OF VANCOMYCIN: CPT | Performed by: NURSE PRACTITIONER

## 2017-01-01 PROCEDURE — 83735 ASSAY OF MAGNESIUM: CPT | Performed by: EMERGENCY MEDICINE

## 2017-01-01 PROCEDURE — 85025 COMPLETE CBC W/AUTO DIFF WBC: CPT | Performed by: INTERNAL MEDICINE

## 2017-01-01 PROCEDURE — 94003 VENT MGMT INPAT SUBQ DAY: CPT

## 2017-01-01 PROCEDURE — 5A1955Z RESPIRATORY VENTILATION, GREATER THAN 96 CONSECUTIVE HOURS: ICD-10-PCS | Performed by: INTERNAL MEDICINE

## 2017-01-01 PROCEDURE — 83735 ASSAY OF MAGNESIUM: CPT | Performed by: INTERNAL MEDICINE

## 2017-01-01 PROCEDURE — 85014 HEMATOCRIT: CPT

## 2017-01-01 PROCEDURE — 93308 TTE F-UP OR LMTD: CPT

## 2017-01-01 PROCEDURE — 80053 COMPREHEN METABOLIC PANEL: CPT | Performed by: NURSE PRACTITIONER

## 2017-01-01 PROCEDURE — 85730 THROMBOPLASTIN TIME PARTIAL: CPT | Performed by: INTERNAL MEDICINE

## 2017-01-01 PROCEDURE — 82803 BLOOD GASES ANY COMBINATION: CPT

## 2017-01-01 PROCEDURE — 82330 ASSAY OF CALCIUM: CPT | Performed by: EMERGENCY MEDICINE

## 2017-01-01 PROCEDURE — 71275 CT ANGIOGRAPHY CHEST: CPT

## 2017-01-01 PROCEDURE — 84165 PROTEIN E-PHORESIS SERUM: CPT

## 2017-01-01 PROCEDURE — 92950 HEART/LUNG RESUSCITATION CPR: CPT

## 2017-01-01 PROCEDURE — 84484 ASSAY OF TROPONIN QUANT: CPT | Performed by: EMERGENCY MEDICINE

## 2017-01-01 PROCEDURE — 93005 ELECTROCARDIOGRAM TRACING: CPT

## 2017-01-01 PROCEDURE — 70450 CT HEAD/BRAIN W/O DYE: CPT

## 2017-01-01 PROCEDURE — 80047 BASIC METABLC PNL IONIZED CA: CPT

## 2017-01-01 PROCEDURE — 84100 ASSAY OF PHOSPHORUS: CPT | Performed by: NURSE PRACTITIONER

## 2017-01-01 PROCEDURE — 82330 ASSAY OF CALCIUM: CPT | Performed by: NURSE PRACTITIONER

## 2017-01-01 PROCEDURE — 82805 BLOOD GASES W/O2 SATURATION: CPT | Performed by: EMERGENCY MEDICINE

## 2017-01-01 PROCEDURE — 83735 ASSAY OF MAGNESIUM: CPT | Performed by: NURSE PRACTITIONER

## 2017-01-01 PROCEDURE — 87205 SMEAR GRAM STAIN: CPT | Performed by: INTERNAL MEDICINE

## 2017-01-01 PROCEDURE — 85730 THROMBOPLASTIN TIME PARTIAL: CPT | Performed by: EMERGENCY MEDICINE

## 2017-01-01 PROCEDURE — 85610 PROTHROMBIN TIME: CPT | Performed by: NURSE PRACTITIONER

## 2017-01-01 PROCEDURE — 83605 ASSAY OF LACTIC ACID: CPT | Performed by: EMERGENCY MEDICINE

## 2017-01-01 PROCEDURE — 87147 CULTURE TYPE IMMUNOLOGIC: CPT | Performed by: INTERNAL MEDICINE

## 2017-01-01 PROCEDURE — 84156 ASSAY OF PROTEIN URINE: CPT

## 2017-01-01 PROCEDURE — 85049 AUTOMATED PLATELET COUNT: CPT | Performed by: EMERGENCY MEDICINE

## 2017-01-01 PROCEDURE — 36415 COLL VENOUS BLD VENIPUNCTURE: CPT | Performed by: EMERGENCY MEDICINE

## 2017-01-01 PROCEDURE — 87186 SC STD MICRODIL/AGAR DIL: CPT | Performed by: INTERNAL MEDICINE

## 2017-01-01 PROCEDURE — 82805 BLOOD GASES W/O2 SATURATION: CPT | Performed by: INTERNAL MEDICINE

## 2017-01-01 PROCEDURE — 99291 CRITICAL CARE FIRST HOUR: CPT

## 2017-01-01 PROCEDURE — 85610 PROTHROMBIN TIME: CPT | Performed by: EMERGENCY MEDICINE

## 2017-01-01 PROCEDURE — 96376 TX/PRO/DX INJ SAME DRUG ADON: CPT

## 2017-01-01 PROCEDURE — C8929 TTE W OR WO FOL WCON,DOPPLER: HCPCS

## 2017-01-01 PROCEDURE — 82330 ASSAY OF CALCIUM: CPT

## 2017-01-01 PROCEDURE — 85025 COMPLETE CBC W/AUTO DIFF WBC: CPT | Performed by: NURSE PRACTITIONER

## 2017-01-01 PROCEDURE — 82947 ASSAY GLUCOSE BLOOD QUANT: CPT

## 2017-01-01 PROCEDURE — 82140 ASSAY OF AMMONIA: CPT | Performed by: EMERGENCY MEDICINE

## 2017-01-01 PROCEDURE — 83721 ASSAY OF BLOOD LIPOPROTEIN: CPT | Performed by: NURSE PRACTITIONER

## 2017-01-01 PROCEDURE — 83880 ASSAY OF NATRIURETIC PEPTIDE: CPT | Performed by: EMERGENCY MEDICINE

## 2017-01-01 PROCEDURE — 74177 CT ABD & PELVIS W/CONTRAST: CPT

## 2017-01-01 PROCEDURE — 96368 THER/DIAG CONCURRENT INF: CPT

## 2017-01-01 PROCEDURE — 70551 MRI BRAIN STEM W/O DYE: CPT

## 2017-01-01 PROCEDURE — 36620 INSERTION CATHETER ARTERY: CPT

## 2017-01-01 PROCEDURE — 86038 ANTINUCLEAR ANTIBODIES: CPT

## 2017-01-01 PROCEDURE — 85027 COMPLETE CBC AUTOMATED: CPT | Performed by: NURSE PRACTITIONER

## 2017-01-01 PROCEDURE — 84146 ASSAY OF PROLACTIN: CPT | Performed by: INTERNAL MEDICINE

## 2017-01-01 PROCEDURE — 94002 VENT MGMT INPAT INIT DAY: CPT

## 2017-01-01 PROCEDURE — 5A12012 PERFORMANCE OF CARDIAC OUTPUT, SINGLE, MANUAL: ICD-10-PCS | Performed by: INTERNAL MEDICINE

## 2017-01-01 PROCEDURE — 87040 BLOOD CULTURE FOR BACTERIA: CPT | Performed by: EMERGENCY MEDICINE

## 2017-01-01 PROCEDURE — 84484 ASSAY OF TROPONIN QUANT: CPT

## 2017-01-01 PROCEDURE — 87493 C DIFF AMPLIFIED PROBE: CPT | Performed by: INTERNAL MEDICINE

## 2017-01-01 PROCEDURE — 71010 HB CHEST X-RAY 1 VIEW FRONTAL: CPT

## 2017-01-01 PROCEDURE — 83036 HEMOGLOBIN GLYCOSYLATED A1C: CPT

## 2017-01-01 PROCEDURE — 80061 LIPID PANEL: CPT | Performed by: NURSE PRACTITIONER

## 2017-01-01 PROCEDURE — 80069 RENAL FUNCTION PANEL: CPT

## 2017-01-01 PROCEDURE — 93005 ELECTROCARDIOGRAM TRACING: CPT | Performed by: INTERNAL MEDICINE

## 2017-01-01 PROCEDURE — 96365 THER/PROPH/DIAG IV INF INIT: CPT

## 2017-01-01 PROCEDURE — 0BH17EZ INSERTION OF ENDOTRACHEAL AIRWAY INTO TRACHEA, VIA NATURAL OR ARTIFICIAL OPENING: ICD-10-PCS | Performed by: INTERNAL MEDICINE

## 2017-01-01 PROCEDURE — 05HM33Z INSERTION OF INFUSION DEVICE INTO RIGHT INTERNAL JUGULAR VEIN, PERCUTANEOUS APPROACH: ICD-10-PCS | Performed by: INTERNAL MEDICINE

## 2017-01-01 RX ORDER — FENTANYL CITRATE 50 UG/ML
50 INJECTION, SOLUTION INTRAMUSCULAR; INTRAVENOUS ONCE
Status: COMPLETED | OUTPATIENT
Start: 2017-01-01 | End: 2017-01-01

## 2017-01-01 RX ORDER — SODIUM CHLORIDE 9 MG/ML
250 INJECTION, SOLUTION INTRAVENOUS CONTINUOUS
Status: DISCONTINUED | OUTPATIENT
Start: 2017-01-01 | End: 2017-01-01

## 2017-01-01 RX ORDER — FUROSEMIDE 10 MG/ML
40 INJECTION INTRAMUSCULAR; INTRAVENOUS ONCE
Status: COMPLETED | OUTPATIENT
Start: 2017-01-01 | End: 2017-01-01

## 2017-01-01 RX ORDER — HYDRALAZINE HYDROCHLORIDE 20 MG/ML
5 INJECTION INTRAMUSCULAR; INTRAVENOUS EVERY 6 HOURS PRN
Status: DISCONTINUED | OUTPATIENT
Start: 2017-01-01 | End: 2017-01-01

## 2017-01-01 RX ORDER — PROPOFOL 10 MG/ML
INJECTION, EMULSION INTRAVENOUS
Status: COMPLETED
Start: 2017-01-01 | End: 2017-01-01

## 2017-01-01 RX ORDER — CHLORHEXIDINE GLUCONATE 0.12 MG/ML
15 RINSE ORAL EVERY 12 HOURS SCHEDULED
Status: DISCONTINUED | OUTPATIENT
Start: 2017-01-01 | End: 2017-01-01

## 2017-01-01 RX ORDER — FENTANYL CITRATE 50 UG/ML
50 INJECTION, SOLUTION INTRAMUSCULAR; INTRAVENOUS EVERY 2 HOUR PRN
Status: DISCONTINUED | OUTPATIENT
Start: 2017-01-01 | End: 2017-01-01

## 2017-01-01 RX ORDER — FUROSEMIDE 10 MG/ML
40 SYRINGE (ML) INJECTION CONTINUOUS
Status: DISCONTINUED | OUTPATIENT
Start: 2017-01-01 | End: 2017-01-01

## 2017-01-01 RX ORDER — BUSPIRONE HYDROCHLORIDE 10 MG/1
TABLET ORAL
Status: COMPLETED
Start: 2017-01-01 | End: 2017-01-01

## 2017-01-01 RX ORDER — POTASSIUM CHLORIDE 20MEQ/15ML
40 LIQUID (ML) ORAL ONCE
Status: COMPLETED | OUTPATIENT
Start: 2017-01-01 | End: 2017-01-01

## 2017-01-01 RX ORDER — LABETALOL HYDROCHLORIDE 5 MG/ML
10 INJECTION, SOLUTION INTRAVENOUS ONCE
Status: COMPLETED | OUTPATIENT
Start: 2017-01-01 | End: 2017-01-01

## 2017-01-01 RX ORDER — SODIUM CHLORIDE, SODIUM GLUCONATE, SODIUM ACETATE, POTASSIUM CHLORIDE, MAGNESIUM CHLORIDE, SODIUM PHOSPHATE, DIBASIC, AND POTASSIUM PHOSPHATE .53; .5; .37; .037; .03; .012; .00082 G/100ML; G/100ML; G/100ML; G/100ML; G/100ML; G/100ML; G/100ML
100 INJECTION, SOLUTION INTRAVENOUS CONTINUOUS
Status: DISCONTINUED | OUTPATIENT
Start: 2017-01-01 | End: 2017-01-01

## 2017-01-01 RX ORDER — LABETALOL HYDROCHLORIDE 5 MG/ML
INJECTION, SOLUTION INTRAVENOUS
Status: COMPLETED
Start: 2017-01-01 | End: 2017-01-01

## 2017-01-01 RX ORDER — LORAZEPAM 2 MG/ML
2 INJECTION INTRAMUSCULAR ONCE
Status: COMPLETED | OUTPATIENT
Start: 2017-01-01 | End: 2017-01-01

## 2017-01-01 RX ORDER — LORAZEPAM 2 MG/ML
1 INJECTION INTRAMUSCULAR ONCE
Status: COMPLETED | OUTPATIENT
Start: 2017-01-01 | End: 2017-01-01

## 2017-01-01 RX ORDER — DEXTROSE AND SODIUM CHLORIDE 5; .9 G/100ML; G/100ML
250 INJECTION, SOLUTION INTRAVENOUS CONTINUOUS
Status: DISCONTINUED | OUTPATIENT
Start: 2017-01-01 | End: 2017-01-01

## 2017-01-01 RX ORDER — INSULIN GLARGINE 100 [IU]/ML
12 INJECTION, SOLUTION SUBCUTANEOUS EVERY 24 HOURS
Status: DISCONTINUED | OUTPATIENT
Start: 2017-01-01 | End: 2017-01-01

## 2017-01-01 RX ORDER — HEPARIN SODIUM 10000 [USP'U]/100ML
INJECTION, SOLUTION INTRAVENOUS
Status: COMPLETED
Start: 2017-01-01 | End: 2017-01-01

## 2017-01-01 RX ORDER — VANCOMYCIN HYDROCHLORIDE 1 G/200ML
1000 INJECTION, SOLUTION INTRAVENOUS EVERY 24 HOURS
Status: DISCONTINUED | OUTPATIENT
Start: 2017-01-01 | End: 2017-01-01

## 2017-01-01 RX ORDER — DEXTROSE MONOHYDRATE 50 MG/ML
125 INJECTION, SOLUTION INTRAVENOUS CONTINUOUS
Status: DISCONTINUED | OUTPATIENT
Start: 2017-01-01 | End: 2017-01-01

## 2017-01-01 RX ORDER — HEPARIN SODIUM 1000 [USP'U]/ML
2000 INJECTION, SOLUTION INTRAVENOUS; SUBCUTANEOUS AS NEEDED
Status: DISCONTINUED | OUTPATIENT
Start: 2017-01-01 | End: 2017-01-01

## 2017-01-01 RX ORDER — MAGNESIUM SULFATE HEPTAHYDRATE 40 MG/ML
2 INJECTION, SOLUTION INTRAVENOUS ONCE
Status: COMPLETED | OUTPATIENT
Start: 2017-01-01 | End: 2017-01-01

## 2017-01-01 RX ORDER — AMLODIPINE BESYLATE 5 MG/1
5 TABLET ORAL DAILY
Status: DISCONTINUED | OUTPATIENT
Start: 2017-01-01 | End: 2017-01-01

## 2017-01-01 RX ORDER — HEPARIN SODIUM 10000 [USP'U]/100ML
3-20 INJECTION, SOLUTION INTRAVENOUS
Status: DISCONTINUED | OUTPATIENT
Start: 2017-01-01 | End: 2017-01-01

## 2017-01-01 RX ORDER — ATORVASTATIN CALCIUM 40 MG/1
40 TABLET, FILM COATED ORAL
Status: DISCONTINUED | OUTPATIENT
Start: 2017-01-01 | End: 2017-01-01

## 2017-01-01 RX ORDER — METOCLOPRAMIDE HYDROCHLORIDE 5 MG/ML
5 INJECTION INTRAMUSCULAR; INTRAVENOUS EVERY 8 HOURS
Status: DISCONTINUED | OUTPATIENT
Start: 2017-01-01 | End: 2017-01-01

## 2017-01-01 RX ORDER — HEPARIN SODIUM 5000 [USP'U]/ML
INJECTION, SOLUTION INTRAVENOUS; SUBCUTANEOUS
Status: DISPENSED
Start: 2017-01-01 | End: 2017-01-01

## 2017-01-01 RX ORDER — BUSPIRONE HYDROCHLORIDE 10 MG/1
10 TABLET ORAL 3 TIMES DAILY
Status: DISCONTINUED | OUTPATIENT
Start: 2017-01-01 | End: 2017-01-01

## 2017-01-01 RX ORDER — MINERAL OIL AND PETROLATUM 150; 830 MG/G; MG/G
OINTMENT OPHTHALMIC
Status: DISCONTINUED | OUTPATIENT
Start: 2017-01-01 | End: 2017-01-01

## 2017-01-01 RX ORDER — SODIUM CHLORIDE 9 MG/ML
500 INJECTION, SOLUTION INTRAVENOUS CONTINUOUS
Status: DISCONTINUED | OUTPATIENT
Start: 2017-01-01 | End: 2017-01-01

## 2017-01-01 RX ORDER — ASPIRIN 325 MG
325 TABLET ORAL DAILY
Status: DISCONTINUED | OUTPATIENT
Start: 2017-01-01 | End: 2017-01-01

## 2017-01-01 RX ORDER — HEPARIN SODIUM 5000 [USP'U]/ML
7500 INJECTION, SOLUTION INTRAVENOUS; SUBCUTANEOUS EVERY 8 HOURS SCHEDULED
Status: DISCONTINUED | OUTPATIENT
Start: 2017-01-01 | End: 2017-01-01 | Stop reason: SDUPTHER

## 2017-01-01 RX ORDER — LORAZEPAM 2 MG/ML
1 INJECTION INTRAMUSCULAR
Status: DISCONTINUED | OUTPATIENT
Start: 2017-01-01 | End: 2017-01-01 | Stop reason: HOSPADM

## 2017-01-01 RX ORDER — HEPARIN SODIUM 5000 [USP'U]/ML
5000 INJECTION, SOLUTION INTRAVENOUS; SUBCUTANEOUS EVERY 8 HOURS SCHEDULED
Status: DISCONTINUED | OUTPATIENT
Start: 2017-01-01 | End: 2017-01-01

## 2017-01-01 RX ORDER — POTASSIUM CHLORIDE 20MEQ/15ML
40 LIQUID (ML) ORAL
Status: DISCONTINUED | OUTPATIENT
Start: 2017-01-01 | End: 2017-01-01

## 2017-01-01 RX ORDER — FENTANYL CITRATE 50 UG/ML
50 INJECTION, SOLUTION INTRAMUSCULAR; INTRAVENOUS
Status: DISCONTINUED | OUTPATIENT
Start: 2017-01-01 | End: 2017-01-01 | Stop reason: HOSPADM

## 2017-01-01 RX ORDER — SODIUM CHLORIDE 9 MG/ML
2000 INJECTION, SOLUTION INTRAVENOUS CONTINUOUS
Status: DISCONTINUED | OUTPATIENT
Start: 2017-01-01 | End: 2017-01-01

## 2017-01-01 RX ORDER — MAGNESIUM SULFATE HEPTAHYDRATE 40 MG/ML
INJECTION, SOLUTION INTRAVENOUS
Status: COMPLETED
Start: 2017-01-01 | End: 2017-01-01

## 2017-01-01 RX ORDER — HEPARIN SODIUM 1000 [USP'U]/ML
4000 INJECTION, SOLUTION INTRAVENOUS; SUBCUTANEOUS ONCE
Status: COMPLETED | OUTPATIENT
Start: 2017-01-01 | End: 2017-01-01

## 2017-01-01 RX ORDER — POTASSIUM CHLORIDE 20MEQ/15ML
40 LIQUID (ML) ORAL 2 TIMES DAILY
Status: COMPLETED | OUTPATIENT
Start: 2017-01-01 | End: 2017-01-01

## 2017-01-01 RX ORDER — LABETALOL HYDROCHLORIDE 5 MG/ML
10 INJECTION, SOLUTION INTRAVENOUS EVERY 4 HOURS PRN
Status: DISCONTINUED | OUTPATIENT
Start: 2017-01-01 | End: 2017-01-01

## 2017-01-01 RX ORDER — GLYCOPYRROLATE 0.2 MG/ML
0.1 INJECTION INTRAMUSCULAR; INTRAVENOUS ONCE
Status: COMPLETED | OUTPATIENT
Start: 2017-01-01 | End: 2017-01-01

## 2017-01-01 RX ORDER — LORAZEPAM 2 MG/ML
INJECTION INTRAMUSCULAR
Status: DISPENSED
Start: 2017-01-01 | End: 2017-01-01

## 2017-01-01 RX ORDER — LABETALOL HYDROCHLORIDE 5 MG/ML
20 INJECTION, SOLUTION INTRAVENOUS EVERY 4 HOURS PRN
Status: DISCONTINUED | OUTPATIENT
Start: 2017-01-01 | End: 2017-01-01

## 2017-01-01 RX ORDER — ACETAMINOPHEN 160 MG/5ML
650 SUSPENSION, ORAL (FINAL DOSE FORM) ORAL EVERY 6 HOURS PRN
Status: DISCONTINUED | OUTPATIENT
Start: 2017-01-01 | End: 2017-01-01

## 2017-01-01 RX ORDER — PANTOPRAZOLE SODIUM 40 MG/1
40 INJECTION, POWDER, FOR SOLUTION INTRAVENOUS
Status: DISCONTINUED | OUTPATIENT
Start: 2017-01-01 | End: 2017-01-01

## 2017-01-01 RX ORDER — AMLODIPINE BESYLATE 10 MG/1
10 TABLET ORAL DAILY
Status: DISCONTINUED | OUTPATIENT
Start: 2017-01-01 | End: 2017-01-01

## 2017-01-01 RX ORDER — FUROSEMIDE 10 MG/ML
INJECTION INTRAMUSCULAR; INTRAVENOUS
Status: COMPLETED
Start: 2017-01-01 | End: 2017-01-01

## 2017-01-01 RX ORDER — FENTANYL CITRATE 50 UG/ML
INJECTION, SOLUTION INTRAMUSCULAR; INTRAVENOUS
Status: COMPLETED
Start: 2017-01-01 | End: 2017-01-01

## 2017-01-01 RX ORDER — HEPARIN SODIUM 1000 [USP'U]/ML
4000 INJECTION, SOLUTION INTRAVENOUS; SUBCUTANEOUS AS NEEDED
Status: DISCONTINUED | OUTPATIENT
Start: 2017-01-01 | End: 2017-01-01

## 2017-01-01 RX ORDER — CARVEDILOL 6.25 MG/1
6.25 TABLET ORAL 2 TIMES DAILY WITH MEALS
Status: DISCONTINUED | OUTPATIENT
Start: 2017-01-01 | End: 2017-01-01

## 2017-01-01 RX ORDER — GLYCOPYRROLATE 0.2 MG/ML
0.2 INJECTION INTRAMUSCULAR; INTRAVENOUS ONCE
Status: COMPLETED | OUTPATIENT
Start: 2017-01-01 | End: 2017-01-01

## 2017-01-01 RX ORDER — LORAZEPAM 2 MG/ML
INJECTION INTRAMUSCULAR
Status: COMPLETED
Start: 2017-01-01 | End: 2017-01-01

## 2017-01-01 RX ORDER — PROPOFOL 10 MG/ML
5-50 INJECTION, EMULSION INTRAVENOUS
Status: DISCONTINUED | OUTPATIENT
Start: 2017-01-01 | End: 2017-01-01

## 2017-01-01 RX ORDER — CARVEDILOL 3.12 MG/1
3.12 TABLET ORAL 2 TIMES DAILY WITH MEALS
Status: DISCONTINUED | OUTPATIENT
Start: 2017-01-01 | End: 2017-01-01

## 2017-01-01 RX ADMIN — DEXMEDETOMIDINE HYDROCHLORIDE 0.7 MCG/KG/HR: 100 INJECTION, SOLUTION INTRAVENOUS at 22:20

## 2017-01-01 RX ADMIN — PIPERACILLIN AND TAZOBACTAM 2.25 G: 2; .25 INJECTION, POWDER, FOR SOLUTION INTRAVENOUS at 00:50

## 2017-01-01 RX ADMIN — CEFAZOLIN SODIUM 1000 MG: 1 SOLUTION INTRAVENOUS at 17:01

## 2017-01-01 RX ADMIN — LABETALOL HYDROCHLORIDE 10 MG: 5 INJECTION, SOLUTION INTRAVENOUS at 08:52

## 2017-01-01 RX ADMIN — VANCOMYCIN HYDROCHLORIDE 1000 MG: 1 INJECTION, SOLUTION INTRAVENOUS at 16:42

## 2017-01-01 RX ADMIN — DEXMEDETOMIDINE HYDROCHLORIDE 0.6 MCG/KG/HR: 100 INJECTION, SOLUTION INTRAVENOUS at 14:30

## 2017-01-01 RX ADMIN — DEXMEDETOMIDINE HYDROCHLORIDE 0.7 MCG/KG/HR: 100 INJECTION, SOLUTION INTRAVENOUS at 23:21

## 2017-01-01 RX ADMIN — LABETALOL HYDROCHLORIDE 10 MG: 5 INJECTION, SOLUTION INTRAVENOUS at 14:31

## 2017-01-01 RX ADMIN — LEVETIRACETAM 2000 MG: 100 INJECTION, SOLUTION INTRAVENOUS at 12:59

## 2017-01-01 RX ADMIN — SODIUM BICARBONATE 25 MEQ: 84 INJECTION INTRAVENOUS at 03:31

## 2017-01-01 RX ADMIN — METOCLOPRAMIDE HYDROCHLORIDE 5 MG: 5 INJECTION INTRAMUSCULAR; INTRAVENOUS at 18:36

## 2017-01-01 RX ADMIN — HEPARIN SODIUM 5000 UNITS: 5000 INJECTION, SOLUTION INTRAVENOUS; SUBCUTANEOUS at 21:50

## 2017-01-01 RX ADMIN — DEXTROSE 125 ML/HR: 5 SOLUTION INTRAVENOUS at 18:42

## 2017-01-01 RX ADMIN — BUSPIRONE HYDROCHLORIDE 10 MG: 10 TABLET ORAL at 08:34

## 2017-01-01 RX ADMIN — METRONIDAZOLE 500 MG: 500 INJECTION, SOLUTION INTRAVENOUS at 23:33

## 2017-01-01 RX ADMIN — SODIUM CHLORIDE, SODIUM GLUCONATE, SODIUM ACETATE, POTASSIUM CHLORIDE, MAGNESIUM CHLORIDE, SODIUM PHOSPHATE, DIBASIC, AND POTASSIUM PHOSPHATE 100 ML/HR: .53; .5; .37; .037; .03; .012; .00082 INJECTION, SOLUTION INTRAVENOUS at 08:46

## 2017-01-01 RX ADMIN — DEXTROSE 125 ML/HR: 5 SOLUTION INTRAVENOUS at 03:47

## 2017-01-01 RX ADMIN — POTASSIUM CHLORIDE 40 MEQ: 20 SOLUTION ORAL at 05:09

## 2017-01-01 RX ADMIN — PIPERACILLIN AND TAZOBACTAM 2.25 G: 2; .25 INJECTION, POWDER, FOR SOLUTION INTRAVENOUS at 17:08

## 2017-01-01 RX ADMIN — PROPOFOL 40 MCG/KG/MIN: 10 INJECTION, EMULSION INTRAVENOUS at 06:20

## 2017-01-01 RX ADMIN — PROPOFOL 25 MCG/KG/MIN: 10 INJECTION, EMULSION INTRAVENOUS at 03:00

## 2017-01-01 RX ADMIN — CARVEDILOL 6.25 MG: 6.25 TABLET, FILM COATED ORAL at 07:33

## 2017-01-01 RX ADMIN — FENTANYL CITRATE 50 MCG: 50 INJECTION INTRAMUSCULAR; INTRAVENOUS at 05:42

## 2017-01-01 RX ADMIN — CHLORHEXIDINE GLUCONATE 15 ML: 1.2 RINSE ORAL at 08:02

## 2017-01-01 RX ADMIN — FENTANYL CITRATE 50 MCG: 50 INJECTION INTRAMUSCULAR; INTRAVENOUS at 16:16

## 2017-01-01 RX ADMIN — GLYCOPYRROLATE 0.1 MG: 0.2 INJECTION, SOLUTION INTRAMUSCULAR; INTRAVENOUS at 13:20

## 2017-01-01 RX ADMIN — ACETAMINOPHEN 650 MG: 160 SUSPENSION ORAL at 19:53

## 2017-01-01 RX ADMIN — LABETALOL HYDROCHLORIDE 20 MG: 5 INJECTION, SOLUTION INTRAVENOUS at 19:24

## 2017-01-01 RX ADMIN — PROPOFOL 40 MCG/KG/MIN: 10 INJECTION, EMULSION INTRAVENOUS at 22:31

## 2017-01-01 RX ADMIN — NOREPINEPHRINE BITARTRATE 20 MCG/MIN: 1 INJECTION INTRAVENOUS at 23:30

## 2017-01-01 RX ADMIN — PROPOFOL 40 MCG/KG/MIN: 10 INJECTION, EMULSION INTRAVENOUS at 01:01

## 2017-01-01 RX ADMIN — MINERAL OIL AND WHITE PETROLATUM: 150; 830 OINTMENT OPHTHALMIC at 22:12

## 2017-01-01 RX ADMIN — FENTANYL CITRATE 50 MCG: 50 INJECTION INTRAMUSCULAR; INTRAVENOUS at 05:14

## 2017-01-01 RX ADMIN — FUROSEMIDE 100 MG: 10 INJECTION, SOLUTION INTRAMUSCULAR; INTRAVENOUS at 13:10

## 2017-01-01 RX ADMIN — DEXMEDETOMIDINE HYDROCHLORIDE 0.5 MCG/KG/HR: 100 INJECTION, SOLUTION INTRAVENOUS at 19:02

## 2017-01-01 RX ADMIN — METOCLOPRAMIDE HYDROCHLORIDE 5 MG: 5 INJECTION INTRAMUSCULAR; INTRAVENOUS at 01:57

## 2017-01-01 RX ADMIN — METOCLOPRAMIDE HYDROCHLORIDE 5 MG: 5 INJECTION INTRAMUSCULAR; INTRAVENOUS at 09:46

## 2017-01-01 RX ADMIN — HEPARIN SODIUM 5000 UNITS: 5000 INJECTION, SOLUTION INTRAVENOUS; SUBCUTANEOUS at 14:15

## 2017-01-01 RX ADMIN — AZITHROMYCIN MONOHYDRATE 500 MG: 500 INJECTION, POWDER, LYOPHILIZED, FOR SOLUTION INTRAVENOUS at 01:35

## 2017-01-01 RX ADMIN — METOCLOPRAMIDE HYDROCHLORIDE 5 MG: 5 INJECTION INTRAMUSCULAR; INTRAVENOUS at 12:52

## 2017-01-01 RX ADMIN — HEPARIN SODIUM 5000 UNITS: 5000 INJECTION, SOLUTION INTRAVENOUS; SUBCUTANEOUS at 13:16

## 2017-01-01 RX ADMIN — DEXMEDETOMIDINE HYDROCHLORIDE 0.7 MCG/KG/HR: 100 INJECTION, SOLUTION INTRAVENOUS at 23:11

## 2017-01-01 RX ADMIN — PANTOPRAZOLE SODIUM 40 MG: 40 INJECTION, POWDER, FOR SOLUTION INTRAVENOUS at 08:39

## 2017-01-01 RX ADMIN — PIPERACILLIN AND TAZOBACTAM 2.25 G: 2; .25 INJECTION, POWDER, FOR SOLUTION INTRAVENOUS at 23:35

## 2017-01-01 RX ADMIN — FENTANYL CITRATE 50 MCG: 50 INJECTION INTRAMUSCULAR; INTRAVENOUS at 19:55

## 2017-01-01 RX ADMIN — CHLORHEXIDINE GLUCONATE 15 ML: 1.2 RINSE ORAL at 20:47

## 2017-01-01 RX ADMIN — PROPOFOL 50 MCG/KG/MIN: 10 INJECTION, EMULSION INTRAVENOUS at 12:37

## 2017-01-01 RX ADMIN — LABETALOL HYDROCHLORIDE 20 MG: 5 INJECTION, SOLUTION INTRAVENOUS at 00:47

## 2017-01-01 RX ADMIN — PANTOPRAZOLE SODIUM 40 MG: 40 INJECTION, POWDER, FOR SOLUTION INTRAVENOUS at 08:48

## 2017-01-01 RX ADMIN — INSULIN GLARGINE 12 UNITS: 100 INJECTION, SOLUTION SUBCUTANEOUS at 09:57

## 2017-01-01 RX ADMIN — MINERAL OIL AND WHITE PETROLATUM: 150; 830 OINTMENT OPHTHALMIC at 21:49

## 2017-01-01 RX ADMIN — POTASSIUM CHLORIDE 40 MEQ: 20 SOLUTION ORAL at 05:21

## 2017-01-01 RX ADMIN — BUSPIRONE HYDROCHLORIDE 10 MG: 10 TABLET ORAL at 20:47

## 2017-01-01 RX ADMIN — CEFAZOLIN SODIUM 1000 MG: 1 SOLUTION INTRAVENOUS at 05:19

## 2017-01-01 RX ADMIN — SODIUM CHLORIDE 3 UNITS/HR: 9 INJECTION, SOLUTION INTRAVENOUS at 06:24

## 2017-01-01 RX ADMIN — HEPARIN SODIUM 5000 UNITS: 5000 INJECTION, SOLUTION INTRAVENOUS; SUBCUTANEOUS at 05:21

## 2017-01-01 RX ADMIN — ASPIRIN 325 MG: 325 TABLET ORAL at 08:31

## 2017-01-01 RX ADMIN — AMLODIPINE BESYLATE 10 MG: 10 TABLET ORAL at 08:02

## 2017-01-01 RX ADMIN — INSULIN GLARGINE 12 UNITS: 100 INJECTION, SOLUTION SUBCUTANEOUS at 12:52

## 2017-01-01 RX ADMIN — DEXTROSE 125 ML/HR: 5 SOLUTION INTRAVENOUS at 13:30

## 2017-01-01 RX ADMIN — METRONIDAZOLE 500 MG: 500 INJECTION, SOLUTION INTRAVENOUS at 23:32

## 2017-01-01 RX ADMIN — BUSPIRONE HYDROCHLORIDE 10 MG: 10 TABLET ORAL at 15:55

## 2017-01-01 RX ADMIN — FENTANYL CITRATE 50 MCG: 50 INJECTION INTRAMUSCULAR; INTRAVENOUS at 10:59

## 2017-01-01 RX ADMIN — SODIUM CHLORIDE, SODIUM GLUCONATE, SODIUM ACETATE, POTASSIUM CHLORIDE, MAGNESIUM CHLORIDE, SODIUM PHOSPHATE, DIBASIC, AND POTASSIUM PHOSPHATE 100 ML/HR: .53; .5; .37; .037; .03; .012; .00082 INJECTION, SOLUTION INTRAVENOUS at 02:00

## 2017-01-01 RX ADMIN — METOCLOPRAMIDE HYDROCHLORIDE 5 MG: 5 INJECTION INTRAMUSCULAR; INTRAVENOUS at 02:39

## 2017-01-01 RX ADMIN — PIPERACILLIN AND TAZOBACTAM 2.25 G: 2; .25 INJECTION, POWDER, FOR SOLUTION INTRAVENOUS at 17:11

## 2017-01-01 RX ADMIN — METOCLOPRAMIDE HYDROCHLORIDE 5 MG: 5 INJECTION INTRAMUSCULAR; INTRAVENOUS at 09:57

## 2017-01-01 RX ADMIN — CEFAZOLIN SODIUM 1000 MG: 1 SOLUTION INTRAVENOUS at 04:25

## 2017-01-01 RX ADMIN — CHLORHEXIDINE GLUCONATE 15 ML: 1.2 RINSE ORAL at 08:34

## 2017-01-01 RX ADMIN — ASPIRIN 325 MG: 325 TABLET ORAL at 09:06

## 2017-01-01 RX ADMIN — AMLODIPINE BESYLATE 5 MG: 5 TABLET ORAL at 08:19

## 2017-01-01 RX ADMIN — FENTANYL CITRATE 50 MCG: 50 INJECTION INTRAMUSCULAR; INTRAVENOUS at 10:50

## 2017-01-01 RX ADMIN — PROPOFOL 50 MCG/KG/MIN: 10 INJECTION, EMULSION INTRAVENOUS at 17:00

## 2017-01-01 RX ADMIN — CARVEDILOL 6.25 MG: 6.25 TABLET, FILM COATED ORAL at 15:55

## 2017-01-01 RX ADMIN — LEVETIRACETAM 1000 MG: 100 INJECTION, SOLUTION INTRAVENOUS at 09:05

## 2017-01-01 RX ADMIN — CEFAZOLIN SODIUM 1000 MG: 1 SOLUTION INTRAVENOUS at 08:22

## 2017-01-01 RX ADMIN — PIPERACILLIN AND TAZOBACTAM 2.25 G: 2; .25 INJECTION, POWDER, FOR SOLUTION INTRAVENOUS at 08:34

## 2017-01-01 RX ADMIN — METOCLOPRAMIDE HYDROCHLORIDE 5 MG: 5 INJECTION INTRAMUSCULAR; INTRAVENOUS at 17:08

## 2017-01-01 RX ADMIN — METRONIDAZOLE 500 MG: 500 INJECTION, SOLUTION INTRAVENOUS at 16:28

## 2017-01-01 RX ADMIN — CARVEDILOL 6.25 MG: 6.25 TABLET, FILM COATED ORAL at 08:30

## 2017-01-01 RX ADMIN — POTASSIUM CHLORIDE 40 MEQ: 20 SOLUTION ORAL at 18:41

## 2017-01-01 RX ADMIN — METOCLOPRAMIDE HYDROCHLORIDE 5 MG: 5 INJECTION INTRAMUSCULAR; INTRAVENOUS at 11:20

## 2017-01-01 RX ADMIN — METRONIDAZOLE 500 MG: 500 INJECTION, SOLUTION INTRAVENOUS at 15:29

## 2017-01-01 RX ADMIN — PANTOPRAZOLE SODIUM 40 MG: 40 INJECTION, POWDER, FOR SOLUTION INTRAVENOUS at 08:34

## 2017-01-01 RX ADMIN — CHLORHEXIDINE GLUCONATE 15 ML: 1.2 RINSE ORAL at 09:05

## 2017-01-01 RX ADMIN — CHLORHEXIDINE GLUCONATE 15 ML: 1.2 RINSE ORAL at 22:27

## 2017-01-01 RX ADMIN — FENTANYL CITRATE 50 MCG: 50 INJECTION INTRAMUSCULAR; INTRAVENOUS at 17:24

## 2017-01-01 RX ADMIN — CHLORHEXIDINE GLUCONATE 15 ML: 1.2 RINSE ORAL at 10:12

## 2017-01-01 RX ADMIN — HEPARIN SODIUM AND DEXTROSE 11.1 UNITS/KG/HR: 10000; 5 INJECTION INTRAVENOUS at 05:21

## 2017-01-01 RX ADMIN — SODIUM CHLORIDE 4 UNITS/HR: 9 INJECTION, SOLUTION INTRAVENOUS at 18:45

## 2017-01-01 RX ADMIN — POTASSIUM CHLORIDE 40 MEQ: 20 SOLUTION ORAL at 11:48

## 2017-01-01 RX ADMIN — METRONIDAZOLE 500 MG: 500 INJECTION, SOLUTION INTRAVENOUS at 08:40

## 2017-01-01 RX ADMIN — ASPIRIN 325 MG: 325 TABLET ORAL at 08:19

## 2017-01-01 RX ADMIN — FENTANYL CITRATE 50 MCG: 50 INJECTION INTRAMUSCULAR; INTRAVENOUS at 13:33

## 2017-01-01 RX ADMIN — POTASSIUM CHLORIDE 40 MEQ: 20 SOLUTION ORAL at 08:39

## 2017-01-01 RX ADMIN — FENTANYL CITRATE 50 MCG: 50 INJECTION INTRAMUSCULAR; INTRAVENOUS at 21:41

## 2017-01-01 RX ADMIN — ASPIRIN 325 MG: 325 TABLET ORAL at 08:39

## 2017-01-01 RX ADMIN — LABETALOL HYDROCHLORIDE 20 MG: 5 INJECTION, SOLUTION INTRAVENOUS at 16:25

## 2017-01-01 RX ADMIN — ACETAMINOPHEN 650 MG: 160 SUSPENSION ORAL at 21:40

## 2017-01-01 RX ADMIN — PIPERACILLIN AND TAZOBACTAM 2.25 G: 2; .25 INJECTION, POWDER, FOR SOLUTION INTRAVENOUS at 00:16

## 2017-01-01 RX ADMIN — LABETALOL HYDROCHLORIDE 20 MG: 5 INJECTION, SOLUTION INTRAVENOUS at 10:05

## 2017-01-01 RX ADMIN — FENTANYL CITRATE 50 MCG: 50 INJECTION INTRAMUSCULAR; INTRAVENOUS at 21:54

## 2017-01-01 RX ADMIN — VANCOMYCIN HYDROCHLORIDE 1000 MG: 1 INJECTION, SOLUTION INTRAVENOUS at 04:25

## 2017-01-01 RX ADMIN — METRONIDAZOLE 500 MG: 500 INJECTION, SOLUTION INTRAVENOUS at 16:07

## 2017-01-01 RX ADMIN — POTASSIUM CHLORIDE 40 MEQ: 20 SOLUTION ORAL at 07:33

## 2017-01-01 RX ADMIN — LABETALOL HYDROCHLORIDE 20 MG: 5 INJECTION, SOLUTION INTRAVENOUS at 02:45

## 2017-01-01 RX ADMIN — DEXMEDETOMIDINE HYDROCHLORIDE 0.7 MCG/KG/HR: 100 INJECTION, SOLUTION INTRAVENOUS at 05:56

## 2017-01-01 RX ADMIN — CEFAZOLIN SODIUM 1000 MG: 1 SOLUTION INTRAVENOUS at 17:02

## 2017-01-01 RX ADMIN — FENTANYL CITRATE 75 MCG/HR: at 04:44

## 2017-01-01 RX ADMIN — SODIUM CHLORIDE 1000 ML: 0.9 INJECTION, SOLUTION INTRAVENOUS at 23:01

## 2017-01-01 RX ADMIN — ACETAMINOPHEN 650 MG: 160 SUSPENSION ORAL at 13:33

## 2017-01-01 RX ADMIN — PIPERACILLIN AND TAZOBACTAM 2.25 G: 2; .25 INJECTION, POWDER, FOR SOLUTION INTRAVENOUS at 16:42

## 2017-01-01 RX ADMIN — FENTANYL CITRATE 50 MCG: 50 INJECTION INTRAMUSCULAR; INTRAVENOUS at 23:49

## 2017-01-01 RX ADMIN — SODIUM BICARBONATE 100 ML/HR: 84 INJECTION, SOLUTION INTRAVENOUS at 11:58

## 2017-01-01 RX ADMIN — ACETAMINOPHEN 650 MG: 160 SUSPENSION ORAL at 03:51

## 2017-01-01 RX ADMIN — DEXMEDETOMIDINE HYDROCHLORIDE 0.7 MCG/KG/HR: 100 INJECTION, SOLUTION INTRAVENOUS at 08:11

## 2017-01-01 RX ADMIN — PANTOPRAZOLE SODIUM 40 MG: 40 INJECTION, POWDER, FOR SOLUTION INTRAVENOUS at 09:35

## 2017-01-01 RX ADMIN — METRONIDAZOLE 500 MG: 500 INJECTION, SOLUTION INTRAVENOUS at 16:21

## 2017-01-01 RX ADMIN — FENTANYL CITRATE 50 MCG: 50 INJECTION INTRAMUSCULAR; INTRAVENOUS at 22:20

## 2017-01-01 RX ADMIN — HEPARIN SODIUM 5000 UNITS: 5000 INJECTION, SOLUTION INTRAVENOUS; SUBCUTANEOUS at 13:20

## 2017-01-01 RX ADMIN — ATORVASTATIN CALCIUM 40 MG: 40 TABLET, FILM COATED ORAL at 15:31

## 2017-01-01 RX ADMIN — FENTANYL CITRATE 50 MCG: 50 INJECTION INTRAMUSCULAR; INTRAVENOUS at 04:23

## 2017-01-01 RX ADMIN — AMLODIPINE BESYLATE 10 MG: 10 TABLET ORAL at 08:39

## 2017-01-01 RX ADMIN — MINERAL OIL AND WHITE PETROLATUM: 150; 830 OINTMENT OPHTHALMIC at 00:02

## 2017-01-01 RX ADMIN — HYDRALAZINE HYDROCHLORIDE 5 MG: 20 INJECTION INTRAMUSCULAR; INTRAVENOUS at 05:39

## 2017-01-01 RX ADMIN — HEPARIN SODIUM 19.1 UNITS/KG/HR: 10000 INJECTION, SOLUTION INTRAVENOUS at 13:27

## 2017-01-01 RX ADMIN — ATORVASTATIN CALCIUM 40 MG: 40 TABLET, FILM COATED ORAL at 16:55

## 2017-01-01 RX ADMIN — PIPERACILLIN AND TAZOBACTAM 2.25 G: 2; .25 INJECTION, POWDER, FOR SOLUTION INTRAVENOUS at 10:13

## 2017-01-01 RX ADMIN — CARVEDILOL 6.25 MG: 6.25 TABLET, FILM COATED ORAL at 15:56

## 2017-01-01 RX ADMIN — DEXMEDETOMIDINE HYDROCHLORIDE 0.7 MCG/KG/HR: 100 INJECTION, SOLUTION INTRAVENOUS at 10:31

## 2017-01-01 RX ADMIN — METOCLOPRAMIDE HYDROCHLORIDE 5 MG: 5 INJECTION INTRAMUSCULAR; INTRAVENOUS at 10:24

## 2017-01-01 RX ADMIN — POTASSIUM CHLORIDE 40 MEQ: 20 SOLUTION ORAL at 17:01

## 2017-01-01 RX ADMIN — SODIUM BICARBONATE 100 ML/HR: 84 INJECTION INTRAVENOUS at 04:07

## 2017-01-01 RX ADMIN — LABETALOL HYDROCHLORIDE 10 MG: 5 INJECTION, SOLUTION INTRAVENOUS at 22:55

## 2017-01-01 RX ADMIN — DEXMEDETOMIDINE HYDROCHLORIDE 0.7 MCG/KG/HR: 100 INJECTION, SOLUTION INTRAVENOUS at 11:30

## 2017-01-01 RX ADMIN — CARVEDILOL 6.25 MG: 6.25 TABLET, FILM COATED ORAL at 08:32

## 2017-01-01 RX ADMIN — INSULIN GLARGINE 12 UNITS: 100 INJECTION, SOLUTION SUBCUTANEOUS at 10:24

## 2017-01-01 RX ADMIN — BUSPIRONE HYDROCHLORIDE 10 MG: 10 TABLET ORAL at 09:06

## 2017-01-01 RX ADMIN — LABETALOL HYDROCHLORIDE 10 MG: 5 INJECTION, SOLUTION INTRAVENOUS at 02:39

## 2017-01-01 RX ADMIN — FUROSEMIDE 40 MG/HR: 10 INJECTION, SOLUTION INTRAMUSCULAR; INTRAVENOUS at 08:13

## 2017-01-01 RX ADMIN — LABETALOL HYDROCHLORIDE 20 MG: 5 INJECTION, SOLUTION INTRAVENOUS at 22:46

## 2017-01-01 RX ADMIN — DEXMEDETOMIDINE HYDROCHLORIDE 0.7 MCG/KG/HR: 100 INJECTION, SOLUTION INTRAVENOUS at 18:45

## 2017-01-01 RX ADMIN — CHLORHEXIDINE GLUCONATE 15 ML: 1.2 RINSE ORAL at 08:19

## 2017-01-01 RX ADMIN — CARVEDILOL 6.25 MG: 6.25 TABLET, FILM COATED ORAL at 07:35

## 2017-01-01 RX ADMIN — METRONIDAZOLE 500 MG: 500 INJECTION, SOLUTION INTRAVENOUS at 07:36

## 2017-01-01 RX ADMIN — PERFLUTREN 0.8 ML/MIN: 6.52 INJECTION, SUSPENSION INTRAVENOUS at 12:03

## 2017-01-01 RX ADMIN — IOHEXOL 100 ML: 350 INJECTION, SOLUTION INTRAVENOUS at 19:25

## 2017-01-01 RX ADMIN — LABETALOL HYDROCHLORIDE 20 MG: 5 INJECTION, SOLUTION INTRAVENOUS at 12:18

## 2017-01-01 RX ADMIN — DEXMEDETOMIDINE HYDROCHLORIDE 0.2 MCG/KG/HR: 100 INJECTION, SOLUTION INTRAVENOUS at 15:56

## 2017-01-01 RX ADMIN — FUROSEMIDE 40 MG: 10 INJECTION INTRAMUSCULAR; INTRAVENOUS at 09:54

## 2017-01-01 RX ADMIN — ASPIRIN 325 MG: 325 TABLET ORAL at 09:35

## 2017-01-01 RX ADMIN — ACETAMINOPHEN 650 MG: 160 SUSPENSION ORAL at 01:06

## 2017-01-01 RX ADMIN — INSULIN GLARGINE 12 UNITS: 100 INJECTION, SOLUTION SUBCUTANEOUS at 11:19

## 2017-01-01 RX ADMIN — CARVEDILOL 6.25 MG: 6.25 TABLET, FILM COATED ORAL at 15:31

## 2017-01-01 RX ADMIN — CHLORHEXIDINE GLUCONATE 15 ML: 1.2 RINSE ORAL at 20:00

## 2017-01-01 RX ADMIN — AZITHROMYCIN MONOHYDRATE 500 MG: 500 INJECTION, POWDER, LYOPHILIZED, FOR SOLUTION INTRAVENOUS at 03:06

## 2017-01-01 RX ADMIN — FENTANYL CITRATE 150 MCG/HR: at 10:49

## 2017-01-01 RX ADMIN — FENTANYL CITRATE 50 MCG: 50 INJECTION INTRAMUSCULAR; INTRAVENOUS at 22:33

## 2017-01-01 RX ADMIN — PANTOPRAZOLE SODIUM 40 MG: 40 INJECTION, POWDER, FOR SOLUTION INTRAVENOUS at 08:19

## 2017-01-01 RX ADMIN — DEXMEDETOMIDINE HYDROCHLORIDE 0.7 MCG/KG/HR: 100 INJECTION, SOLUTION INTRAVENOUS at 01:04

## 2017-01-01 RX ADMIN — LABETALOL HYDROCHLORIDE 10 MG: 5 INJECTION, SOLUTION INTRAVENOUS at 05:42

## 2017-01-01 RX ADMIN — LABETALOL HYDROCHLORIDE 10 MG: 5 INJECTION, SOLUTION INTRAVENOUS at 01:50

## 2017-01-01 RX ADMIN — METOCLOPRAMIDE HYDROCHLORIDE 5 MG: 5 INJECTION INTRAMUSCULAR; INTRAVENOUS at 10:57

## 2017-01-01 RX ADMIN — SODIUM CHLORIDE 14 UNITS/HR: 9 INJECTION, SOLUTION INTRAVENOUS at 08:30

## 2017-01-01 RX ADMIN — HEPARIN SODIUM 5000 UNITS: 5000 INJECTION, SOLUTION INTRAVENOUS; SUBCUTANEOUS at 21:48

## 2017-01-01 RX ADMIN — CEFAZOLIN SODIUM 1000 MG: 1 SOLUTION INTRAVENOUS at 16:58

## 2017-01-01 RX ADMIN — ACETAMINOPHEN 650 MG: 160 SUSPENSION ORAL at 04:11

## 2017-01-01 RX ADMIN — ACETAMINOPHEN 650 MG: 160 SUSPENSION ORAL at 19:13

## 2017-01-01 RX ADMIN — DEXMEDETOMIDINE HYDROCHLORIDE 0.6 MCG/KG/HR: 100 INJECTION, SOLUTION INTRAVENOUS at 18:39

## 2017-01-01 RX ADMIN — SODIUM BICARBONATE 100 ML/HR: 84 INJECTION, SOLUTION INTRAVENOUS at 13:33

## 2017-01-01 RX ADMIN — LABETALOL HYDROCHLORIDE 10 MG: 5 INJECTION, SOLUTION INTRAVENOUS at 17:08

## 2017-01-01 RX ADMIN — METOCLOPRAMIDE HYDROCHLORIDE 5 MG: 5 INJECTION INTRAMUSCULAR; INTRAVENOUS at 17:17

## 2017-01-01 RX ADMIN — FUROSEMIDE 40 MG/HR: 10 INJECTION, SOLUTION INTRAMUSCULAR; INTRAVENOUS at 13:20

## 2017-01-01 RX ADMIN — ATORVASTATIN CALCIUM 40 MG: 40 TABLET, FILM COATED ORAL at 15:56

## 2017-01-01 RX ADMIN — METOCLOPRAMIDE HYDROCHLORIDE 5 MG: 5 INJECTION INTRAMUSCULAR; INTRAVENOUS at 03:32

## 2017-01-01 RX ADMIN — CHLORHEXIDINE GLUCONATE 15 ML: 1.2 RINSE ORAL at 21:45

## 2017-01-01 RX ADMIN — HEPARIN SODIUM 5000 UNITS: 5000 INJECTION, SOLUTION INTRAVENOUS; SUBCUTANEOUS at 05:42

## 2017-01-01 RX ADMIN — MAGNESIUM SULFATE HEPTAHYDRATE 2 G: 40 INJECTION, SOLUTION INTRAVENOUS at 04:38

## 2017-01-01 RX ADMIN — AMLODIPINE BESYLATE 10 MG: 10 TABLET ORAL at 09:35

## 2017-01-01 RX ADMIN — DEXMEDETOMIDINE HYDROCHLORIDE 0.7 MCG/KG/HR: 100 INJECTION, SOLUTION INTRAVENOUS at 05:22

## 2017-01-01 RX ADMIN — HEPARIN SODIUM 5000 UNITS: 5000 INJECTION, SOLUTION INTRAVENOUS; SUBCUTANEOUS at 00:03

## 2017-01-01 RX ADMIN — DEXMEDETOMIDINE HYDROCHLORIDE 0.7 MCG/KG/HR: 100 INJECTION, SOLUTION INTRAVENOUS at 03:44

## 2017-01-01 RX ADMIN — ATORVASTATIN CALCIUM 40 MG: 40 TABLET, FILM COATED ORAL at 16:15

## 2017-01-01 RX ADMIN — FUROSEMIDE 40 MG/HR: 10 INJECTION, SOLUTION INTRAMUSCULAR; INTRAVENOUS at 19:33

## 2017-01-01 RX ADMIN — FENTANYL CITRATE 50 MCG: 50 INJECTION INTRAMUSCULAR; INTRAVENOUS at 03:00

## 2017-01-01 RX ADMIN — CARVEDILOL 3.12 MG: 3.12 TABLET, FILM COATED ORAL at 16:15

## 2017-01-01 RX ADMIN — POTASSIUM CHLORIDE 40 MEQ: 20 SOLUTION ORAL at 18:42

## 2017-01-01 RX ADMIN — PIPERACILLIN AND TAZOBACTAM 2.25 G: 2; .25 INJECTION, POWDER, FOR SOLUTION INTRAVENOUS at 01:27

## 2017-01-01 RX ADMIN — ACETAMINOPHEN 650 MG: 160 SUSPENSION ORAL at 07:59

## 2017-01-01 RX ADMIN — INSULIN GLARGINE 12 UNITS: 100 INJECTION, SOLUTION SUBCUTANEOUS at 10:56

## 2017-01-01 RX ADMIN — LEVETIRACETAM 1000 MG: 100 INJECTION, SOLUTION INTRAVENOUS at 08:02

## 2017-01-01 RX ADMIN — PIPERACILLIN AND TAZOBACTAM 2.25 G: 2; .25 INJECTION, POWDER, FOR SOLUTION INTRAVENOUS at 08:06

## 2017-01-01 RX ADMIN — SODIUM CHLORIDE 13 UNITS/HR: 9 INJECTION, SOLUTION INTRAVENOUS at 09:03

## 2017-01-01 RX ADMIN — CARVEDILOL 3.12 MG: 3.12 TABLET, FILM COATED ORAL at 08:19

## 2017-01-01 RX ADMIN — LEVETIRACETAM 1000 MG: 100 INJECTION, SOLUTION INTRAVENOUS at 20:01

## 2017-01-01 RX ADMIN — FENTANYL CITRATE 100 MCG/HR: at 00:07

## 2017-01-01 RX ADMIN — SODIUM CHLORIDE 1000 ML: 0.9 INJECTION, SOLUTION INTRAVENOUS at 22:00

## 2017-01-01 RX ADMIN — METRONIDAZOLE 500 MG: 500 INJECTION, SOLUTION INTRAVENOUS at 15:57

## 2017-01-01 RX ADMIN — VANCOMYCIN HYDROCHLORIDE 1000 MG: 1 INJECTION, SOLUTION INTRAVENOUS at 04:45

## 2017-01-01 RX ADMIN — LABETALOL HYDROCHLORIDE 20 MG: 5 INJECTION, SOLUTION INTRAVENOUS at 19:52

## 2017-01-01 RX ADMIN — LEVETIRACETAM 1000 MG: 100 INJECTION, SOLUTION INTRAVENOUS at 09:34

## 2017-01-01 RX ADMIN — HEPARIN SODIUM 4000 UNITS: 1000 INJECTION, SOLUTION INTRAVENOUS; SUBCUTANEOUS at 14:38

## 2017-01-01 RX ADMIN — PANTOPRAZOLE SODIUM 40 MG: 40 INJECTION, POWDER, FOR SOLUTION INTRAVENOUS at 08:02

## 2017-01-01 RX ADMIN — SODIUM CHLORIDE 12 UNITS/HR: 9 INJECTION, SOLUTION INTRAVENOUS at 16:44

## 2017-01-01 RX ADMIN — ATORVASTATIN CALCIUM 40 MG: 40 TABLET, FILM COATED ORAL at 16:07

## 2017-01-01 RX ADMIN — DEXMEDETOMIDINE HYDROCHLORIDE 0.7 MCG/KG/HR: 100 INJECTION, SOLUTION INTRAVENOUS at 21:01

## 2017-01-01 RX ADMIN — CEFAZOLIN SODIUM 1000 MG: 1 SOLUTION INTRAVENOUS at 17:39

## 2017-01-01 RX ADMIN — PANTOPRAZOLE SODIUM 40 MG: 40 INJECTION, POWDER, FOR SOLUTION INTRAVENOUS at 09:03

## 2017-01-01 RX ADMIN — PANTOPRAZOLE SODIUM 40 MG: 40 INJECTION, POWDER, FOR SOLUTION INTRAVENOUS at 08:31

## 2017-01-01 RX ADMIN — METRONIDAZOLE 500 MG: 500 INJECTION, SOLUTION INTRAVENOUS at 08:43

## 2017-01-01 RX ADMIN — HEPARIN SODIUM 15.1 UNITS/KG/HR: 10000 INJECTION, SOLUTION INTRAVENOUS at 22:49

## 2017-01-01 RX ADMIN — LABETALOL HYDROCHLORIDE 10 MG: 5 INJECTION, SOLUTION INTRAVENOUS at 10:19

## 2017-01-01 RX ADMIN — DEXMEDETOMIDINE HYDROCHLORIDE 0.7 MCG/KG/HR: 100 INJECTION, SOLUTION INTRAVENOUS at 03:15

## 2017-01-01 RX ADMIN — FENTANYL CITRATE 50 MCG: 50 INJECTION INTRAMUSCULAR; INTRAVENOUS at 04:58

## 2017-01-01 RX ADMIN — FENTANYL CITRATE 50 MCG: 50 INJECTION INTRAMUSCULAR; INTRAVENOUS at 00:53

## 2017-01-01 RX ADMIN — MINERAL OIL AND WHITE PETROLATUM 1 APPLICATION: 150; 830 OINTMENT OPHTHALMIC at 22:27

## 2017-01-01 RX ADMIN — METOCLOPRAMIDE HYDROCHLORIDE 5 MG: 5 INJECTION INTRAMUSCULAR; INTRAVENOUS at 18:42

## 2017-01-01 RX ADMIN — FUROSEMIDE 40 MG: 10 INJECTION, SOLUTION INTRAMUSCULAR; INTRAVENOUS at 09:54

## 2017-01-01 RX ADMIN — VANCOMYCIN HYDROCHLORIDE 2000 MG: 1 INJECTION, POWDER, LYOPHILIZED, FOR SOLUTION INTRAVENOUS at 02:50

## 2017-01-01 RX ADMIN — FENTANYL CITRATE 100 MCG/HR: at 06:43

## 2017-01-01 RX ADMIN — FENTANYL CITRATE 50 MCG: 50 INJECTION INTRAMUSCULAR; INTRAVENOUS at 10:04

## 2017-01-01 RX ADMIN — CARVEDILOL 6.25 MG: 6.25 TABLET, FILM COATED ORAL at 16:54

## 2017-01-01 RX ADMIN — PROPOFOL 50 MCG/KG/MIN: 10 INJECTION, EMULSION INTRAVENOUS at 09:19

## 2017-01-01 RX ADMIN — DEXMEDETOMIDINE HYDROCHLORIDE 0.7 MCG/KG/HR: 100 INJECTION, SOLUTION INTRAVENOUS at 01:55

## 2017-01-01 RX ADMIN — HEPARIN SODIUM 5000 UNITS: 5000 INJECTION, SOLUTION INTRAVENOUS; SUBCUTANEOUS at 05:28

## 2017-01-01 RX ADMIN — SODIUM BICARBONATE 100 ML/HR: 84 INJECTION, SOLUTION INTRAVENOUS at 22:51

## 2017-01-01 RX ADMIN — HEPARIN SODIUM 2000 UNITS: 1000 INJECTION, SOLUTION INTRAVENOUS; SUBCUTANEOUS at 04:42

## 2017-01-01 RX ADMIN — FENTANYL CITRATE 50 MCG: 50 INJECTION INTRAMUSCULAR; INTRAVENOUS at 02:06

## 2017-01-01 RX ADMIN — DEXMEDETOMIDINE HYDROCHLORIDE 0.7 MCG/KG/HR: 100 INJECTION, SOLUTION INTRAVENOUS at 07:36

## 2017-01-01 RX ADMIN — FENTANYL CITRATE 50 MCG: 50 INJECTION INTRAMUSCULAR; INTRAVENOUS at 19:32

## 2017-01-01 RX ADMIN — BUSPIRONE HYDROCHLORIDE 10 MG: 10 TABLET ORAL at 01:34

## 2017-01-01 RX ADMIN — ACETAMINOPHEN 650 MG: 160 SUSPENSION ORAL at 14:38

## 2017-01-01 RX ADMIN — LABETALOL HYDROCHLORIDE 20 MG: 5 INJECTION, SOLUTION INTRAVENOUS at 10:50

## 2017-01-01 RX ADMIN — INSULIN GLARGINE 12 UNITS: 100 INJECTION, SOLUTION SUBCUTANEOUS at 10:41

## 2017-01-01 RX ADMIN — CHLORHEXIDINE GLUCONATE 15 ML: 1.2 RINSE ORAL at 08:31

## 2017-01-01 RX ADMIN — PROPOFOL 50 MCG/KG/MIN: 10 INJECTION, EMULSION INTRAVENOUS at 14:24

## 2017-01-01 RX ADMIN — FENTANYL CITRATE 50 MCG: 50 INJECTION INTRAMUSCULAR; INTRAVENOUS at 10:19

## 2017-01-01 RX ADMIN — FENTANYL CITRATE 50 MCG: 50 INJECTION, SOLUTION INTRAMUSCULAR; INTRAVENOUS at 13:33

## 2017-01-01 RX ADMIN — FENTANYL CITRATE 50 MCG: 50 INJECTION INTRAMUSCULAR; INTRAVENOUS at 19:40

## 2017-01-01 RX ADMIN — FENTANYL CITRATE 50 MCG: 50 INJECTION INTRAMUSCULAR; INTRAVENOUS at 20:20

## 2017-01-01 RX ADMIN — POTASSIUM CHLORIDE 40 MEQ: 20 SOLUTION ORAL at 08:31

## 2017-01-01 RX ADMIN — CARVEDILOL 6.25 MG: 6.25 TABLET, FILM COATED ORAL at 16:07

## 2017-01-01 RX ADMIN — DEXMEDETOMIDINE HYDROCHLORIDE 0.7 MCG/KG/HR: 100 INJECTION, SOLUTION INTRAVENOUS at 16:21

## 2017-01-01 RX ADMIN — METRONIDAZOLE 500 MG: 500 INJECTION, SOLUTION INTRAVENOUS at 01:23

## 2017-01-01 RX ADMIN — GLYCOPYRROLATE 0.2 MG: 0.2 INJECTION, SOLUTION INTRAMUSCULAR; INTRAVENOUS at 10:48

## 2017-01-01 RX ADMIN — HEPARIN SODIUM 5000 UNITS: 5000 INJECTION, SOLUTION INTRAVENOUS; SUBCUTANEOUS at 21:41

## 2017-01-01 RX ADMIN — SODIUM BICARBONATE 100 ML/HR: 84 INJECTION, SOLUTION INTRAVENOUS at 01:52

## 2017-01-01 RX ADMIN — ACETAMINOPHEN 650 MG: 160 SUSPENSION ORAL at 20:59

## 2017-01-01 RX ADMIN — HEPARIN SODIUM 5000 UNITS: 5000 INJECTION, SOLUTION INTRAVENOUS; SUBCUTANEOUS at 05:39

## 2017-01-01 RX ADMIN — Medication: at 10:43

## 2017-01-01 RX ADMIN — CHLORHEXIDINE GLUCONATE 15 ML: 1.2 RINSE ORAL at 20:03

## 2017-01-01 RX ADMIN — ATORVASTATIN CALCIUM 40 MG: 40 TABLET, FILM COATED ORAL at 15:55

## 2017-01-01 RX ADMIN — AMLODIPINE BESYLATE 10 MG: 10 TABLET ORAL at 09:04

## 2017-01-01 RX ADMIN — CEFAZOLIN SODIUM 1000 MG: 1 SOLUTION INTRAVENOUS at 04:54

## 2017-01-01 RX ADMIN — CHLORHEXIDINE GLUCONATE 15 ML: 1.2 RINSE ORAL at 00:03

## 2017-01-01 RX ADMIN — DEXMEDETOMIDINE HYDROCHLORIDE 0.7 MCG/KG/HR: 100 INJECTION, SOLUTION INTRAVENOUS at 20:00

## 2017-01-01 RX ADMIN — FENTANYL CITRATE 50 MCG: 50 INJECTION INTRAMUSCULAR; INTRAVENOUS at 17:17

## 2017-01-01 RX ADMIN — POTASSIUM CHLORIDE 40 MEQ: 20 SOLUTION ORAL at 17:17

## 2017-01-01 RX ADMIN — PANTOPRAZOLE SODIUM 40 MG: 40 INJECTION, POWDER, FOR SOLUTION INTRAVENOUS at 09:05

## 2017-01-01 RX ADMIN — METOCLOPRAMIDE HYDROCHLORIDE 5 MG: 5 INJECTION INTRAMUSCULAR; INTRAVENOUS at 16:58

## 2017-01-01 RX ADMIN — LORAZEPAM 2 MG: 2 INJECTION, SOLUTION INTRAMUSCULAR; INTRAVENOUS at 12:34

## 2017-01-01 RX ADMIN — DEXMEDETOMIDINE HYDROCHLORIDE 0.7 MCG/KG/HR: 100 INJECTION, SOLUTION INTRAVENOUS at 21:04

## 2017-01-01 RX ADMIN — LABETALOL HYDROCHLORIDE 10 MG: 5 INJECTION, SOLUTION INTRAVENOUS at 22:12

## 2017-01-01 RX ADMIN — CHLORHEXIDINE GLUCONATE 15 ML: 1.2 RINSE ORAL at 21:41

## 2017-01-01 RX ADMIN — CEFAZOLIN SODIUM 1000 MG: 1 SOLUTION INTRAVENOUS at 05:43

## 2017-01-01 RX ADMIN — LABETALOL HYDROCHLORIDE 20 MG: 5 INJECTION, SOLUTION INTRAVENOUS at 04:17

## 2017-01-01 RX ADMIN — CARVEDILOL 6.25 MG: 6.25 TABLET, FILM COATED ORAL at 06:33

## 2017-01-01 RX ADMIN — FENTANYL CITRATE 75 MCG/HR: at 17:53

## 2017-01-01 RX ADMIN — FUROSEMIDE 40 MG/HR: 10 INJECTION, SOLUTION INTRAMUSCULAR; INTRAVENOUS at 22:21

## 2017-01-01 RX ADMIN — FENTANYL CITRATE 75 MCG/HR: at 20:00

## 2017-01-01 RX ADMIN — LABETALOL HYDROCHLORIDE 20 MG: 5 INJECTION, SOLUTION INTRAVENOUS at 08:03

## 2017-01-01 RX ADMIN — DEXMEDETOMIDINE HYDROCHLORIDE 0.7 MCG/KG/HR: 100 INJECTION, SOLUTION INTRAVENOUS at 12:14

## 2017-01-01 RX ADMIN — LABETALOL HYDROCHLORIDE 10 MG: 5 INJECTION, SOLUTION INTRAVENOUS at 19:39

## 2017-01-01 RX ADMIN — LABETALOL HYDROCHLORIDE 20 MG: 5 INJECTION, SOLUTION INTRAVENOUS at 03:33

## 2017-01-01 RX ADMIN — ACETAMINOPHEN 650 MG: 160 SUSPENSION ORAL at 23:29

## 2017-01-01 RX ADMIN — AZITHROMYCIN MONOHYDRATE 500 MG: 500 INJECTION, POWDER, LYOPHILIZED, FOR SOLUTION INTRAVENOUS at 02:25

## 2017-01-01 RX ADMIN — METOCLOPRAMIDE HYDROCHLORIDE 5 MG: 5 INJECTION INTRAMUSCULAR; INTRAVENOUS at 01:52

## 2017-01-01 RX ADMIN — HEPARIN SODIUM 11.1 UNITS/KG/HR: 10000 INJECTION, SOLUTION INTRAVENOUS at 05:21

## 2017-01-01 RX ADMIN — HEPARIN SODIUM 2000 UNITS: 1000 INJECTION, SOLUTION INTRAVENOUS; SUBCUTANEOUS at 20:24

## 2017-01-01 RX ADMIN — FENTANYL CITRATE 50 MCG: 50 INJECTION INTRAMUSCULAR; INTRAVENOUS at 12:27

## 2017-01-01 RX ADMIN — CEFAZOLIN SODIUM 1000 MG: 1 SOLUTION INTRAVENOUS at 16:52

## 2017-01-01 RX ADMIN — POTASSIUM CHLORIDE 40 MEQ: 20 SOLUTION ORAL at 05:32

## 2017-01-01 RX ADMIN — LORAZEPAM 2 MG: 2 INJECTION, SOLUTION INTRAMUSCULAR; INTRAVENOUS at 10:17

## 2017-01-01 RX ADMIN — CARVEDILOL 6.25 MG: 6.25 TABLET, FILM COATED ORAL at 08:39

## 2017-01-01 RX ADMIN — CHLORHEXIDINE GLUCONATE 15 ML: 1.2 RINSE ORAL at 08:54

## 2017-01-01 RX ADMIN — METOCLOPRAMIDE HYDROCHLORIDE 5 MG: 5 INJECTION INTRAMUSCULAR; INTRAVENOUS at 02:01

## 2017-01-01 RX ADMIN — METRONIDAZOLE 500 MG: 500 INJECTION, SOLUTION INTRAVENOUS at 07:53

## 2017-01-01 RX ADMIN — ACETAMINOPHEN 650 MG: 160 SUSPENSION ORAL at 11:03

## 2017-01-01 RX ADMIN — DEXMEDETOMIDINE HYDROCHLORIDE 0.7 MCG/KG/HR: 100 INJECTION, SOLUTION INTRAVENOUS at 13:21

## 2017-01-01 RX ADMIN — DEXMEDETOMIDINE HYDROCHLORIDE 0.7 MCG/KG/HR: 100 INJECTION, SOLUTION INTRAVENOUS at 06:46

## 2017-01-01 RX ADMIN — LABETALOL HYDROCHLORIDE 10 MG: 5 INJECTION, SOLUTION INTRAVENOUS at 20:59

## 2017-01-01 RX ADMIN — HEPARIN SODIUM 5000 UNITS: 5000 INJECTION, SOLUTION INTRAVENOUS; SUBCUTANEOUS at 05:17

## 2017-01-01 RX ADMIN — DEXMEDETOMIDINE HYDROCHLORIDE 0.7 MCG/KG/HR: 100 INJECTION, SOLUTION INTRAVENOUS at 01:21

## 2017-01-01 RX ADMIN — DEXMEDETOMIDINE HYDROCHLORIDE 0.4 MCG/KG/HR: 100 INJECTION, SOLUTION INTRAVENOUS at 00:07

## 2017-01-01 RX ADMIN — LABETALOL HYDROCHLORIDE 10 MG: 5 INJECTION, SOLUTION INTRAVENOUS at 12:52

## 2017-01-01 RX ADMIN — SODIUM CHLORIDE 1.5 UNITS/HR: 9 INJECTION, SOLUTION INTRAVENOUS at 18:31

## 2017-01-01 RX ADMIN — CHLORHEXIDINE GLUCONATE 15 ML: 1.2 RINSE ORAL at 08:39

## 2017-01-01 RX ADMIN — AMLODIPINE BESYLATE 10 MG: 10 TABLET ORAL at 08:33

## 2017-01-01 RX ADMIN — HEPARIN SODIUM 5000 UNITS: 5000 INJECTION, SOLUTION INTRAVENOUS; SUBCUTANEOUS at 14:56

## 2017-01-01 RX ADMIN — LORAZEPAM 2 MG: 2 INJECTION, SOLUTION INTRAMUSCULAR; INTRAVENOUS at 10:49

## 2017-01-01 RX ADMIN — CALCIUM GLUCONATE 2 G: 94 INJECTION, SOLUTION INTRAVENOUS at 04:18

## 2017-01-01 RX ADMIN — AMLODIPINE BESYLATE 5 MG: 5 TABLET ORAL at 17:57

## 2017-01-01 RX ADMIN — ACETAMINOPHEN 650 MG: 160 SUSPENSION ORAL at 15:53

## 2017-01-01 RX ADMIN — CHLORHEXIDINE GLUCONATE 15 ML: 1.2 RINSE ORAL at 09:34

## 2017-01-01 RX ADMIN — POTASSIUM CHLORIDE 40 MEQ: 20 SOLUTION ORAL at 12:31

## 2017-01-01 RX ADMIN — LABETALOL HYDROCHLORIDE 10 MG: 5 INJECTION, SOLUTION INTRAVENOUS at 14:30

## 2017-01-01 RX ADMIN — ASPIRIN 325 MG: 325 TABLET ORAL at 09:04

## 2017-01-01 RX ADMIN — FENTANYL CITRATE 50 MCG: 50 INJECTION INTRAMUSCULAR; INTRAVENOUS at 07:43

## 2017-01-01 RX ADMIN — CARVEDILOL 6.25 MG: 6.25 TABLET, FILM COATED ORAL at 16:15

## 2017-01-01 RX ADMIN — INSULIN GLARGINE 12 UNITS: 100 INJECTION, SOLUTION SUBCUTANEOUS at 09:42

## 2017-01-01 RX ADMIN — DEXMEDETOMIDINE HYDROCHLORIDE 0.7 MCG/KG/HR: 100 INJECTION, SOLUTION INTRAVENOUS at 13:28

## 2017-01-01 RX ADMIN — METOCLOPRAMIDE HYDROCHLORIDE 5 MG: 5 INJECTION INTRAMUSCULAR; INTRAVENOUS at 17:01

## 2017-01-01 RX ADMIN — LABETALOL HYDROCHLORIDE 10 MG: 5 INJECTION, SOLUTION INTRAVENOUS at 16:15

## 2017-01-01 RX ADMIN — METOCLOPRAMIDE HYDROCHLORIDE 5 MG: 5 INJECTION INTRAMUSCULAR; INTRAVENOUS at 10:03

## 2017-01-01 RX ADMIN — HEPARIN SODIUM 5000 UNITS: 5000 INJECTION, SOLUTION INTRAVENOUS; SUBCUTANEOUS at 05:59

## 2017-01-01 RX ADMIN — HEPARIN SODIUM 5000 UNITS: 5000 INJECTION, SOLUTION INTRAVENOUS; SUBCUTANEOUS at 21:45

## 2017-01-01 RX ADMIN — SODIUM CHLORIDE 1 UNITS/HR: 9 INJECTION, SOLUTION INTRAVENOUS at 00:11

## 2017-01-01 RX ADMIN — LEVETIRACETAM 1000 MG: 100 INJECTION, SOLUTION INTRAVENOUS at 21:42

## 2017-01-01 RX ADMIN — HEPARIN SODIUM 21 UNITS/KG/HR: 10000 INJECTION, SOLUTION INTRAVENOUS at 05:10

## 2017-01-01 RX ADMIN — METOCLOPRAMIDE HYDROCHLORIDE 5 MG: 5 INJECTION INTRAMUSCULAR; INTRAVENOUS at 01:23

## 2017-01-01 RX ADMIN — HEPARIN SODIUM 2000 UNITS: 1000 INJECTION, SOLUTION INTRAVENOUS; SUBCUTANEOUS at 13:28

## 2017-01-01 RX ADMIN — SODIUM CHLORIDE 8 UNITS/HR: 9 INJECTION, SOLUTION INTRAVENOUS at 01:47

## 2017-01-01 RX ADMIN — AMLODIPINE BESYLATE 10 MG: 10 TABLET ORAL at 08:31

## 2017-01-01 RX ADMIN — SODIUM CHLORIDE 4 UNITS/HR: 9 INJECTION, SOLUTION INTRAVENOUS at 21:46

## 2017-01-01 RX ADMIN — DEXMEDETOMIDINE HYDROCHLORIDE 0.7 MCG/KG/HR: 100 INJECTION, SOLUTION INTRAVENOUS at 10:38

## 2017-01-01 RX ADMIN — HEPARIN SODIUM 5000 UNITS: 5000 INJECTION, SOLUTION INTRAVENOUS; SUBCUTANEOUS at 22:27

## 2017-01-01 RX ADMIN — DEXMEDETOMIDINE HYDROCHLORIDE 0.7 MCG/KG/HR: 100 INJECTION, SOLUTION INTRAVENOUS at 08:50

## 2017-01-01 RX ADMIN — HEPARIN SODIUM 5000 UNITS: 5000 INJECTION, SOLUTION INTRAVENOUS; SUBCUTANEOUS at 14:30

## 2017-01-01 RX ADMIN — PIPERACILLIN AND TAZOBACTAM 2.25 G: 2; .25 INJECTION, POWDER, FOR SOLUTION INTRAVENOUS at 08:19

## 2017-01-01 RX ADMIN — DEXTROSE 125 ML/HR: 5 SOLUTION INTRAVENOUS at 20:44

## 2017-01-01 RX ADMIN — METOCLOPRAMIDE HYDROCHLORIDE 5 MG: 5 INJECTION INTRAMUSCULAR; INTRAVENOUS at 09:42

## 2017-01-01 RX ADMIN — METRONIDAZOLE 500 MG: 500 INJECTION, SOLUTION INTRAVENOUS at 00:08

## 2017-01-01 RX ADMIN — DEXMEDETOMIDINE HYDROCHLORIDE 0.7 MCG/KG/HR: 100 INJECTION, SOLUTION INTRAVENOUS at 09:47

## 2017-01-01 RX ADMIN — METOCLOPRAMIDE HYDROCHLORIDE 5 MG: 5 INJECTION INTRAMUSCULAR; INTRAVENOUS at 03:29

## 2017-01-01 RX ADMIN — HEPARIN SODIUM 4000 UNITS: 1000 INJECTION, SOLUTION INTRAVENOUS; SUBCUTANEOUS at 05:20

## 2017-01-01 RX ADMIN — HEPARIN SODIUM 5000 UNITS: 5000 INJECTION, SOLUTION INTRAVENOUS; SUBCUTANEOUS at 13:30

## 2017-01-01 RX ADMIN — POTASSIUM CHLORIDE 40 MEQ: 20 SOLUTION ORAL at 12:42

## 2017-01-01 RX ADMIN — MINERAL OIL AND WHITE PETROLATUM: 150; 830 OINTMENT OPHTHALMIC at 21:48

## 2017-01-01 RX ADMIN — METRONIDAZOLE 500 MG: 500 INJECTION, SOLUTION INTRAVENOUS at 23:11

## 2017-01-01 RX ADMIN — DEXMEDETOMIDINE HYDROCHLORIDE 0.6 MCG/KG/HR: 100 INJECTION, SOLUTION INTRAVENOUS at 17:16

## 2017-01-01 RX ADMIN — DEXMEDETOMIDINE HYDROCHLORIDE 0.7 MCG/KG/HR: 100 INJECTION, SOLUTION INTRAVENOUS at 18:44

## 2017-01-01 RX ADMIN — ASPIRIN 325 MG: 325 TABLET ORAL at 08:02

## 2017-01-01 RX ADMIN — LABETALOL HYDROCHLORIDE 10 MG: 5 INJECTION, SOLUTION INTRAVENOUS at 08:24

## 2017-01-01 RX ADMIN — DEXTROSE 125 ML/HR: 5 SOLUTION INTRAVENOUS at 03:44

## 2017-01-01 RX ADMIN — CHLORHEXIDINE GLUCONATE 15 ML: 1.2 RINSE ORAL at 20:01

## 2017-01-01 RX ADMIN — MINERAL OIL AND WHITE PETROLATUM: 150; 830 OINTMENT OPHTHALMIC at 21:41

## 2017-01-01 RX ADMIN — FENTANYL CITRATE 50 MCG: 50 INJECTION INTRAMUSCULAR; INTRAVENOUS at 01:47

## 2017-01-01 RX ADMIN — LABETALOL HYDROCHLORIDE 20 MG: 5 INJECTION, SOLUTION INTRAVENOUS at 03:04

## 2017-01-01 RX ADMIN — ASPIRIN 325 MG: 325 TABLET ORAL at 08:33

## 2017-01-01 RX ADMIN — PROPOFOL 50 MCG/KG/MIN: 10 INJECTION, EMULSION INTRAVENOUS at 19:41

## 2017-01-01 RX ADMIN — ASPIRIN 325 MG: 325 TABLET ORAL at 08:34

## 2017-01-01 RX ADMIN — LORAZEPAM 1 MG: 2 INJECTION, SOLUTION INTRAMUSCULAR; INTRAVENOUS at 04:12

## 2017-01-01 RX ADMIN — PROPOFOL 25 MCG/KG/MIN: 10 INJECTION, EMULSION INTRAVENOUS at 04:02

## 2017-01-01 RX ADMIN — LABETALOL HYDROCHLORIDE 20 MG: 5 INJECTION, SOLUTION INTRAVENOUS at 14:23

## 2017-01-01 RX ADMIN — METRONIDAZOLE 500 MG: 500 INJECTION, SOLUTION INTRAVENOUS at 08:50

## 2017-03-10 PROBLEM — E87.2 LACTIC ACIDOSIS: Status: ACTIVE | Noted: 2017-01-01

## 2017-03-10 PROBLEM — E66.9 OBESITY: Status: ACTIVE | Noted: 2017-01-01

## 2017-03-10 PROBLEM — R57.9 SHOCK (HCC): Status: ACTIVE | Noted: 2017-01-01

## 2017-03-10 PROBLEM — R77.8 TROPONIN LEVEL ELEVATED: Status: ACTIVE | Noted: 2017-01-01

## 2017-03-10 PROBLEM — G93.40 ENCEPHALOPATHY ACUTE: Status: ACTIVE | Noted: 2017-01-01

## 2017-03-10 PROBLEM — I10 HYPERTENSION: Status: ACTIVE | Noted: 2017-01-01

## 2017-03-10 PROBLEM — J96.90 RESPIRATORY FAILURE (HCC): Status: ACTIVE | Noted: 2017-01-01

## 2017-03-10 PROBLEM — I46.9 CARDIAC ARREST (HCC): Status: ACTIVE | Noted: 2017-01-01

## 2017-03-10 PROBLEM — N17.9 AKI (ACUTE KIDNEY INJURY) (HCC): Status: ACTIVE | Noted: 2017-01-01

## 2017-03-10 PROBLEM — R19.7 DIARRHEA: Status: ACTIVE | Noted: 2017-01-01

## 2017-03-20 LAB
ATRIAL RATE: 65 BPM
ATRIAL RATE: 69 BPM
P AXIS: 65 DEGREES
P AXIS: 72 DEGREES
PR INTERVAL: 150 MS
PR INTERVAL: 167 MS
QRS AXIS: 53 DEGREES
QRS AXIS: 53 DEGREES
QRSD INTERVAL: 100 MS
QRSD INTERVAL: 100 MS
QT INTERVAL: 408 MS
QT INTERVAL: 450 MS
QTC INTERVAL: 424 MS
QTC INTERVAL: 482 MS
T WAVE AXIS: 100 DEGREES
T WAVE AXIS: 134 DEGREES
VENTRICULAR RATE: 65 BPM
VENTRICULAR RATE: 69 BPM

## 2017-08-01 DIAGNOSIS — N18.30 CHRONIC KIDNEY DISEASE, STAGE III (MODERATE) (HCC): ICD-10-CM

## 2018-01-09 NOTE — PROCEDURES
Procedures by Jerome Dent DO at 3/9/2017  11:11 PM      Author:  Jerome Dent DO Service:  Critical Care/ICU Author Type:  Resident    Filed:  3/10/2017  2:34 AM Date of Service:  3/9/2017 11:11 PM Status:  Attested    :  Jerome Dent DO (Resident)  Cosigner:  Aye Cox MD at 3/10/2017  6:12 AM      Procedure Orders:       1  CENTRAL LINE [[id number]] ordered by Jerome Dent DO at 03/10/17 0231                 Post-procedure Diagnoses:       1  Cardiac arrest [I46 9]              Attestation signed by Aye Cox MD at 3/10/2017  6:12 AM           Date of service- 3/9/17    I was present and directly supervised this procedure  There were no complications                                           Central Line Insertion  Date/Time: 3/9/2017 9:31 PM  Performed by: Lawanda Rey by: Stan Solo     Consent:     Consent obtained:  Emergent situation  Pre-procedure details:     Hand hygiene: Hand hygiene performed prior to insertion      Sterile barrier technique: All elements of maximal sterile technique followed      Skin preparation:  2% chlorhexidine    Skin preparation agent: Skin preparation agent completely dried prior to procedure    Indications:     Central line indications: vascular access and treatment therapy    Anesthesia (see MAR for exact dosages):      Anesthesia method:  Local infiltration    Local anesthetic:  Lidocaine 1% w/o epi  Procedure details:     Location:  Right internal jugular    Vessel type: vein      Laterality:  Right    Approach: percutaneous technique used      Patient position:  Flat    Catheter type:  Triple lumen 16cm    Catheter size:  7 Fr    Landmarks identified: yes      Number of attempts:  1    Successful placement: yes      Vessel of catheter tip end:  Cavoatrial junction  Post-procedure details:     Post-procedure:  Dressing applied and line sutured    Assessment:  Blood return through all ports, no pneumothorax on x-ray, free fluid flow and placement verified by x-ray    Patient tolerance of procedure:   Tolerated well, no immediate complications                     Received for:Provider  EPIC   Mar 10 2017  6:13AM [facility] Standard Time

## 2018-01-09 NOTE — PROCEDURES
Procedures by Mak Millan MD  at 3/17/2017  1:37 PM      Author:  Mak Millan MD Service:  Neurology Author Type:  Physician     Filed:  3/17/2017  1:40 PM Date of Service:  3/17/2017  1:37 PM Status:  Signed     :  Mak Milaln MD (Physician)            Continuous Video EEG Monitoring       Patient Name:  Renee Camejo  MRN: [id number]   :  1960 File #: Guerline Chris [id number]   Age: 60 y o  Encounter #: [id number]   Date Performed: 3/17/2017    Report date: 3/17/2017          Study type: Continuous video EEG    ICD 10 diagnosis: Anoxic encephalopathy    Start time: 08:01 End time: 16:55     -------------------------------------------------------------------------------------------------------------------   Patient History:  60year old female with  diabetes and hypertension who presented in cardiac arrest on 17  She had some sputum production and chest pain which was worsening  Patient came to ED and lost consciousness while in the parking lot  CPR was administered for 15-20 minutes  She had 24 hrs of induced hypothermia and has been warmed since  Was previously monitored on EEG which was abnormal with a background of unreactive  severe diffuse suppression with interictal discharges  Had a witnessed episode of generalized shaking, so LTM was requested to check for electrographic seizures      Medications include:     amLODIPine 10 mg Oral Daily   artificial tear  Both Eyes HS   aspirin 325 mg Per OG Tube Daily   atorvastatin 40 mg Oral Daily With Dinner   carvedilol 6 25 mg Oral BID With Meals   cefazolin 1,000 mg Intravenous Q12H   chlorhexidine 15 mL Swish & Spit Q12H [address]   heparin (porcine) 5,000 Units Subcutaneous Q8H [address]   insulin glargine 12 Units Subcutaneous Q24H   levETIRAcetam 1,000 mg Intravenous Q12H [address]   metoclopromide 5 mg Intravenous Q8H   metroNIDAZOLE 500 mg Intravenous Q8H   pantoprazole 40 mg Intravenous Q24H GUIDO   potassium chloride 40 mEq Oral TID (diuretic) -------------------------------------------------------------------------------------------------------------------   Description of Procedure:  ·  32 channel digital recording with electrodes placed according to the International 10-20 system with additional  T1/T2 electrodes, EOG, EKG, and simultaneous  video  A monitoring technologist supervised the continuous recording  The recording was technically satisfactory  -------------------------------------------------------------------------------------------------------------------   Results:   Background Activity:   When viewed at the standard sensitivity of 7 microvolts per millimeter, no EEG activity is seen at any point during this recording  Activation Procedures:   Neither hyperventilation nor photic stimulation was performed  Abnormal Findings:  See Background Activity  No focal abnormalities or interictal epileptiform discharges were noted  No electrographic seizures were observed during this recording  Other findings: The single lead ECG demonstrated a regular rhythm  Events:   No push buttons were activated  -------------------------------------------------------------------------------------------------------------------   Interpretation:   Markedly abnormal 9  hour continuous video-EEG recording, due to complete absence of EEG activity  when viewed at standard settings  Note that this study was not performed to assess for electrocerebral silence, so the electrocerebral silence protocol was not initiated  Moreover,  a great deal of muscle artifact was present during the recording which could, theoretically, obscure EEG activity  No focal abnormalities nor interictal epileptiform discharges were noted  No electrographic seizures occurred during the recording      Scribe Attestation:  Documented by Maryrose Gowers, acting as a scribe for Dr Julissa To on 3/17/17 at  1:39 PM     Physician Attestation:  All documentation recorded by the scribe accurately reflects the service I personally performed and the decisions made by me  I was present during the time the encounter was recorded and have reviewed all the documentation and confirm that it is all accurate  and representative of the encounter  Moon Mix MD   Medical Director  [address] [facility]  Pager # [place] [phone]    Mar 17 2017  1:40PM [facility] Standard Time

## 2018-01-09 NOTE — PROCEDURES
Procedures by Olga Siegel MD at 3/11/2017   7:50 AM      Author:  Olga Siegel MD Service:  Neurology Author Type:  Physician     Filed:  3/11/2017  8:19 AM Date of Service:  3/11/2017  7:50 AM Status:  Signed     :  Olga Siegel MD (Physician)            Continuous Video EEG Monitoring       Patient Name:  Irwin Hall  MRN: [id number]   :  1960 File #: n/[id number]   Age: 60 y o  Encounter #: [id number]   Study Start: 3/10/2017 08:00  Study End: 3/11/2017 08:00           Report date: 3/11/2017          Study type: Continuous video EEG    ICD 10 diagnosis: Coma R40 2, cardiac arrest    -------------------------------------------------  Patient History: This recording was observed in a 60 y o  female  to monitor for seizures in an unresponsive patient following cardiac arrest      Medications include:     artificial tear  Both Eyes HS   aspirin 325 mg Per OG Tube Daily   azithromycin 500 mg Intravenous Q24H   busPIRone 10 mg Oral TID   chlorhexidine 15 mL Swish & Spit Q12H [facility]   pantoprazole 40 mg Intravenous Q24H GUIDO   piperacillin-tazobactam 2 25 g Intravenous Q8H   vancomycin 1,000 mg Intravenous Q24H       dextrose 5 % and sodium chloride 0 9 % 250 mL/hr Last Rate: Stopped (03/10/17 0831)   fentanyl 75 mcg/hr Last Rate: 75 mcg/hr (17)   heparin (porcine) 3-20 Units/kg/hr (Order-Specific) Last Rate: 17 1 Units/kg/hr (17)   insulin regular (HumuLIN R,NovoLIN R) infusion 0 3-21 Units/hr Last Rate: 8 Units/hr (17)   norepinephrine 1-30 mcg/min Last Rate: Stopped (03/10/17 0045)   propofol 5-50 mcg/kg/min Last Rate: 20 mcg/kg/min (17)   sodium bicarbonate infusion 100 mL/hr Last Rate: 100 mL/hr (17)       -------------------------------------------------  Description of Procedure:  32 channel digital recording with electrodes placed according to the International 10-20 system with additional T1/T2 electrodes,  EOG, EKG, and simultaneous video   A monitoring technologist supervised the continuous recording  The recording was technically satisfactory  -------------------------------------------------  Results:   Manual Review: The recording was obtained with the patient intubated and ventilated  No age or state appropriate activities were seen  Instead, background activities consisted  of unreactive, diffuse severe suppression  Other findings:  Samples of the single channel ECG demonstrated a regular rhythm  Events:   No push buttons were activated  -------------------------------------------------  Interpretation: This prolonged, continuous video-EEG recording is markedly abnormal  A background of unreactive  severe diffuse suppression indicates nonspecific severe diffuse cerebral dysfunction  No electrographic seizures or interictal epileptiform discharges are seen  In the absence of significant confounding factors (sedative medication effect, severe metabolic abnormalities), an unreactive EEG following cardiac arrest suggests an unfavorable outcome with high  specificity (Crit Care  2010;14(5):R178)  Clinical correlation is required  Noralyn Mohs, MD   [facility] [place] OLIVARES    Mar 11 2017  8:19AM [facility] Standard Time

## 2018-01-11 NOTE — PROGRESS NOTES
Active Problems    1  Anxiety (300 00) (F41 9)   2  Benign essential hypertension (401 1) (I10)   3  CKD stage G3b/A3, GFR 30-44 and albumin creatinine ratio >300 mg/g (585 3) (N18 3)   4  Depression (311) (F32 9)   5  GARCIA (dyspnea on exertion) (786 09) (R06 09)   6  Hypercholesterolemia (272 0) (E78 00)   7  Hyperlipidemia (272 4) (E78 5)   8  Hypertriglyceridemia (272 1) (E78 1)   9  Morbid obesity with BMI of 40 0-44 9, adult (278 01,V85 41) (E66 01,Z68 41)   10  Obstructive sleep apnea (327 23) (G47 33)   11  Palpitations (785 1) (R00 2)   12  Pre-procedural cardiovascular examination (V72 81) (Z01 810)   13  Type 2 diabetes mellitus with hyperglycemia (250 00) (E11 65)   14  Uncontrolled type 2 diabetes mellitus with stage 3 chronic kidney disease, with    long-term current use of insulin (250 52,585 3,V58 67) (E11 22,E11 65,N18 3,Z79  4)    Surgical History    1  History of Blood Transfusion (___ Ml)   2  History of Total Abdominal Hysterectomy    Family History  Mother    1  Family history of CAD, multiple vessel  Father    2  Family history of CAD, multiple vessel  Family History    3  Family history of essential hypertension (V17 49) (Z82 49)   4  Family history of Other specified diabetes mellitus without complications    Social History    · Current every day smoker (305 1) (F17 200)   · No alcohol use   · No caffeine use   · No drug use   · Recently stopped smoking    Current Meds   1  AmLODIPine Besylate 10 MG Oral Tablet; TAKE 1 TABLET DAILY AS DIRECTED; Therapy: (Recorded:23Wjx5275) to Recorded   2  Atorvastatin Calcium 40 MG Oral Tablet; TAKE 1 TABLET AT BEDTIME  Requested for:   61LHI3071; Last Rx:10Nov2016 Ordered   3  BD Insulin Syringe U-100 1 ML Miscellaneous; USE 1 DAILY; Therapy: 20MVO4563 to (Evaluate:30Jjq2715)  Requested for: 24Feb2016; Last   Rx:24Feb2016 Ordered   4  BD Pen Needle Mariana U/F 32G X 4 MM Miscellaneous; Using 4 needles daily as directed;    Therapy: 97Xkn9848 to (Evaluate:82Zuw8750)  Requested for: 56YKY5907; Last   Rx:96Sez9999 Ordered   5  ClonazePAM 1 MG Oral Tablet; Therapy: (Recorded:02Mar2016) to Recorded   6  Doxepin HCl - 50 MG Oral Capsule; Therapy: (Recorded:02Mar2016) to Recorded   7  HumuLIN R U-500 KwikPen 500 UNIT/ML Subcutaneous Solution Pen-injector; INJECT   40 UNIT Every 6 hours; Therapy: 84JRP8774 to (Last Rx:15Dec2016)  Requested for: 52OGS3027 Ordered   8  LORazepam TABS; Therapy: (Recorded:25Oct2016) to Recorded   9  Mirtazapine 15 MG Oral Tablet; Therapy: (Recorded:02Mar2016) to Recorded   10  RisperiDONE 1 MG Oral Tablet; TAKE 1 TABLET TWICE DAILY; Therapy: (Recorded:03Aug2016) to Recorded   11  Valsartan-Hydrochlorothiazide 320-25 MG Oral Tablet; TAKE 1 TABLET DAILY  Requested    for: 38Vop8271; Last Rx:75Uos3633 Ordered   12  Venlafaxine HCl ER 75 MG Oral Capsule Extended Release 24 Hour; Therapy: (Recorded:02Mar2016) to Recorded    Allergies    1  No Known Drug Allergies     Note   Note:   Met with patient prior to appointment with RD to review workflow, and weight loss  Patient has been smoke free 2 weeks, I provided handout for cardiology so she can schedule appointment, she walks 20 minutes daily, no rice or beans and sticks to vegetable, salads and soups( prepared by boyfriend)  She has appointment with nephrology 2/2/17 and with PCP on 2/2/17( needs letter of support from PCP and clearance letter from nephrology)  Patient shared she has a nurse visit 2x a week to help provide diabetic education, monitor sugar and blood pressure  She continues with psychiatrist and therapist and drinks over 60 ounces of water daily  Patient is progressing gradually  She is a very slow reader however completed first 1 chapter and RD for 2/9/17 @10:30 am  [place]      Future Appointments    Date/Time Provider Specialty Site   02/02/2017 11:00 AM CASIMIRO Soliz   Nephrology [address] 04/27/2017 02:15 PM Joelle Groves, [address] [facility] ENDOCRINOLOGY     Signatures   Electronically signed by : BEVERLY Ramirez; Jan 27 2017 10:16AM EST                       (Author)    Electronically signed by : CASIMIRO Kinsey ; Jan 27 2017 11:52AM EST                       (Validation)

## 2018-01-11 NOTE — PROGRESS NOTES
Plan    1  DSMT/MNT Time Record; Status:Complete;   Done: 89DDO6614 02:27PM    Discussion/Summary  The patient has the current Goals: 1  Walk for 15 minutes most days of the week  2  Continue to follow current insulin and BG testing schedule  Make changes as ordered by Dr Kayla Pedraza  PATIENT EDUCATION RECORD She has linguistic ( speaks Georgian ) barriers to learning  Diabetes Disease Process:   She understands the pathophysiology of diabetes: Method: Instruction and Handout  Response: Verbalizes Understanding and Needs Review   Healthy Eating:   Discussed general nutrition topics: Method: Instruction and Handout  Response: Verbalizes Understanding and Needs Review   Being Active:   Stated the benefits of exercise: Method: Instruction and Handout  Response: Verbalizes Understanding and Needs Review   Discussed proper exercise program: Method: Instruction and Handout  Response: Verbalizes Understanding and Needs Review   Monitoring:   Discussed safe lancet disposal: Method: Instruction  Response: Verbalizes Understanding and Needs Review  Discussed reporting of readings to M D : Method: Instruction  Response: Verbalizes Understanding and Returns Demonstration  Taking Medication: Response: Verbalizes Instruction   She uses U500 FlexPen  Response: [employer]  Response: [employer]  Discussed safe needle disposal  Method: Instruction  Response: Verbalizes Instruction and Needs Review  Discussed side effects and precautions of insulin  Method: Instruction and Handout  Response: Verbalizes Instruction and Needs Review  She was given Hypoglycemia Tear Sheet   Problem Solving: She is on medications that cause hypoglycemia   Hypoglycemia: Stated definition and causes: Method: Instruction and Handout  Response: Verbalizes Understanding and Needs Review   Described signs and symptoms: Method: Instruction and Handout  Response: Verbalizes Understanding and Needs Review   Discussed prevention: Method: Instruction and Handout  Response: Verbalizes Instruction and Needs Review   Discussed treatment: Method: Instruction and Handout  Response: Verbalizes Instruction and Needs Review   Reducing Risk:   Discussed appropriate diabetic foot care  Method: Instruction and Handout  Response: Verbalizes Instruction and Needs Review  Discussed lipid goals  Method: Instruction  Response: Verbalizes Instruction and Needs Review  Education Plan/Path:  She needs an individual consultation  Will determine need for additional DSMT visits after her next endo visit  There is certainly more to teach her and she would benefit from ongoing encouragement  She was given the following educational materials: Hyperglycemia/Hypoglycemia and Hungarian "Living with Diabetes" book, [facility] [employer] Hungarian guide to better office visits, Foot care tear sheet  "Minestrone for your muscles" exercise sheet  Chief Complaint  Luis Watt was seen for DSME for type 2 diabetes      History of Present Illness  Marta admitted that she doesn't exercise despite multiple providers encouraging this for the benefit of diabetes and weight control  Explained pathophysiology of diabetes and how exercise can help with insulin resistance  She said, when given a scale of 1-10 regarding beginning exercise with 10 being highly likely, that she would give it a 10  Stated that her partner walks every day and thought he might walk with her for the first 15 minutes while she is getting used to the activity  We talked about U500 insulin  It seems that her dosage and timing is different than prescribed on 12/15/16 by Dr Pedrito Gonzalez because she was in the hospital for a few days shortly after that encounter  Now taking 50 units U500 3 times/day  BG records submitted for review  Reviewed diabetic foot care, dosing insulin and rotating sites, sharps disposal, and portion control  End of Encounter Meds    1   Valsartan-Hydrochlorothiazide 320-25 MG Oral Tablet; TAKE 1 TABLET DAILY  Requested   for: 98Hmo2722; Last Rx:85Yri2104 Ordered    2  Atorvastatin Calcium 40 MG Oral Tablet; TAKE 1 TABLET AT BEDTIME  Requested for:   52DGM7414; Last Rx:10Nov2016 Ordered    3  BD Insulin Syringe U-100 1 ML Miscellaneous; USE 1 DAILY; Therapy: 98JPH0539 to (Evaluate:16Naz5121)  Requested for: 63Njm8402; Last   Rx:72Fxn7008 Ordered   4  BD Pen Needle Mariana U/F 32G X 4 MM Miscellaneous; Using 4 needles daily as directed; Therapy: 58RMZ8066 to (Evaluate:02Wtc4881)  Requested for: 29ZNS4248; Last   Rx:69Dbj0924 Ordered   5  HumuLIN R U-500 KwikPen 500 UNIT/ML Subcutaneous Solution Pen-injector; INJECT   40 UNIT Every 6 hours; Therapy: 32VVL7498 to (Last Rx:80Ngi5954)  Requested for: 18LEP7056 Ordered    6  AmLODIPine Besylate 10 MG Oral Tablet; TAKE 1 TABLET DAILY AS DIRECTED; Therapy: (Kathren Form) to Recorded   7  ClonazePAM 1 MG Oral Tablet; Therapy: (Recorded:02Mar2016) to Recorded   8  Doxepin HCl - 50 MG Oral Capsule; Therapy: (Recorded:02Mar2016) to Recorded   9  LORazepam TABS; Therapy: (Recorded:25Oct2016) to Recorded   10  Mirtazapine 15 MG Oral Tablet; Therapy: (Recorded:02Mar2016) to Recorded   11  RisperiDONE 1 MG Oral Tablet; TAKE 1 TABLET TWICE DAILY; Therapy: (Recorded:62Vkz7656) to Recorded   12  Venlafaxine HCl ER 75 MG Oral Capsule Extended Release 24 Hour; Therapy: (Recorded:02Mar2016) to Recorded    Future Appointments    Date/Time Provider Specialty Site   02/02/2017 11:00 AM CASIMIRO Fabian   Nephrology [place] [address]   01/26/2017 01:30 PM Ashley Patrick, [facility] Endocrinology [facility] ENDOCRINOLOGY     Signatures   Electronically signed by : Ronna Alvarez RD; Jan 13 2017  2:49PM EST                       (Author)    Electronically signed by : CASIMIRO Foster ; Jan 17 2017 10:30AM EST

## 2018-01-11 NOTE — PROCEDURES
Procedures by Daryl Covington MD at 3/13/2017   7:49 AM      Author:  Daryl Covington MD Service:  Neurology Author Type:  Physician     Filed:  3/13/2017  7:52 AM Date of Service:  3/13/2017  7:49 AM Status:  Signed     :  Daryl Covington MD (Physician)            Continuous Video EEG Monitoring       Patient Name:  Jos Rosen  MRN: [id number]   :  1960 File #: n/a   Age: 60 y o  Encounter #: [id number]   Study Start: 3/11/17 08:00  Study End: 3/12/17 08:00           Report date: 3/13/2017          Study type: Continuous video EEG    ICD 10 diagnosis: Coma R40 2, cardiac arrest    -------------------------------------------------  Patient History: This recording was observed in a 60 y o  female  to monitor for seizures in an unresponsive patient following cardiac arrest      Medications include:     amLODIPine 10 mg Oral Daily   artificial tear  Both Eyes HS   aspirin 325 mg Per OG Tube Daily   atorvastatin 40 mg Oral Daily With Dinner   carvedilol 6 25 mg Oral BID With Meals   cefazolin 1,000 mg Intravenous Q12H   chlorhexidine 15 mL Swish & Spit Q12H [address]   heparin (porcine) 5,000 Units Subcutaneous Q8H [address]   insulin glargine 12 Units Subcutaneous Q24H   metoclopromide 5 mg Intravenous Q8H   metroNIDAZOLE 500 mg Intravenous Q8H   pantoprazole 40 mg Intravenous Q24H [address]       dexmedetomidine 0 1-0 7 mcg/kg/hr Last Rate: 0 7 mcg/kg/hr (17 0600)   furosemide 40 mg/hr Last Rate: 40 mg/hr (17)   insulin regular (HumuLIN R,NovoLIN R) infusion 0 3-21 Units/hr Last Rate: 5 Units/hr (17 0735)       -------------------------------------------------  Description of Procedure:  32 channel digital recording with electrodes placed according to the International 10-20 system with additional T1/T2 electrodes,  EOG, EKG, and simultaneous video  A monitoring technologist supervised the continuous recording    The recording was technically satisfactory  -------------------------------------------------  Results:   Manual Review: The recording was obtained with the patient intubated and ventilated  No age or state appropriate activities were seen  Instead, background activities consisted  of unreactive, diffuse severe suppression  At times there were occasional brief low amplitude theta/delta activities best seen anteriorly  Other findings:  Samples of the single channel ECG demonstrated a regular rhythm  Events:   No push buttons were activated  -------------------------------------------------  Interpretation: This prolonged, continuous video-EEG recording is markedly abnormal  A background of unreactive  severe diffuse suppression indicates nonspecific severe diffuse cerebral dysfunction  There were occasional brief low amplitude theta/delta activities that are favored to represent artifact  No electrographic seizures or interictal epileptiform discharges are seen  In the absence of significant confounding factors (sedative medication effect, severe metabolic abnormalities), an unreactive EEG following cardiac arrest suggests an unfavorable outcome with high  specificity (Crit Care  2010;14(5):R198)  Clinical correlation is required  Kareen Stewart MD   [address] [facility] Neurology Donnamarie Babinski M D    Mar 13 2017  9:17AM [facility] Standard Time

## 2018-01-12 NOTE — MISCELLANEOUS
Message  Patient's fasting blood sugar was 544 on BMP  I faxed the lab results to Dr Khoi Osei office, patient's Endocrinologist and also called the patient to advise her to please call Dr Khoi Osei office  Patient states she checks her blood sugars 4 times a day but can't see the results  I also called Dr Khoi Osei office myself and was told Gwen Doshi, a nurse was looking at the results in the system and will take care of it  As for the kidney function, Dr Valeri Mina said patient's creatinine is down to baseline, and we will see her as planned in February with a repeat renal Function Panel and a UPC  The patient is aware of this and the appointment was made, the lab slips were mailed to the patient  AG      Active Problems    1  Anxiety (300 00) (F41 9)   2  Benign essential hypertension (401 1) (I10)   3  CKD stage G3b/A3, GFR 30-44 and albumin creatinine ratio >300 mg/g (585 3) (N18 3)   4  Depression (311) (F32 9)   5  AGRCIA (dyspnea on exertion) (786 09) (R06 09)   6  Hypercholesterolemia (272 0) (E78 00)   7  Hyperlipidemia (272 4) (E78 5)   8  Hypertriglyceridemia (272 1) (E78 1)   9  Morbid obesity with BMI of 40 0-44 9, adult (278 01,V85 41) (E66 01,Z68 41)   10  Obstructive sleep apnea (327 23) (G47 33)   11  Palpitations (785 1) (R00 2)   12  Pre-procedural cardiovascular examination (V72 81) (Z01 810)   13  Type 2 diabetes mellitus with hyperglycemia (250 00) (E11 65)   14  Uncontrolled type 2 diabetes mellitus with stage 3 chronic kidney disease, with long-term    current use of insulin (250 52,585 3,V58 67) (E11 22,E11 65,N18 3,Z79  4)    Current Meds   1  AmLODIPine Besylate 10 MG Oral Tablet; TAKE 1 TABLET DAILY AS DIRECTED; Therapy: (Recorded:80Yob9514) to Recorded   2  Atorvastatin Calcium 40 MG Oral Tablet; TAKE 1 TABLET AT BEDTIME  Requested for:   73WLN4901; Last Rx:10Nov2016 Ordered   3  BD Insulin Syringe U-100 1 ML Miscellaneous; USE 1 DAILY;    Therapy: 07TZZ0641 to (Evaluate:41Ose4391) Requested for: 92Wpj2439; Last   Rx:17Jyc4508 Ordered   4  BD Pen Needle Mariana U/F 32G X 4 MM Miscellaneous; USE 3 TIMES A DAY; Therapy: 30MPX1880 to (Evaluate:25Stk0435)  Requested for: 24Dgk2135; Last   Rx:23Dwy2689 Ordered   5  ClonazePAM 1 MG Oral Tablet; Therapy: (Recorded:02Mar2016) to Recorded   6  Doxepin HCl - 50 MG Oral Capsule; Therapy: (Recorded:02Mar2016) to Recorded   7  Lantus SoloStar 100 UNIT/ML Subcutaneous Solution Pen-injector; ADMINISTER 65   UNITS UNDER THE SKIN DAILY; Therapy: 26QWO0069 to (Evaluate:77Lui6525)  Requested for: 99ZWJ9360; Last   Rx:06Dmw5301 Ordered   8  LORazepam TABS; Therapy: (Recorded:25Oct2016) to Recorded   9  Mirtazapine 15 MG Oral Tablet; Therapy: (Recorded:02Mar2016) to Recorded   10  NovoLOG FlexPen 100 UNIT/ML Subcutaneous Solution Pen-injector; 24 units before    breakfast 22 units before lunch  18 units before supper; Therapy: 04Kib6893 to (Evaluate:72Tqh5791)  Requested for: 26Oct2016; Last    Rx:06Akj1740 Ordered   11  RisperiDONE 1 MG Oral Tablet; TAKE 1 TABLET TWICE DAILY; Therapy: (Recorded:77Pno0503) to Recorded   12  Valsartan-Hydrochlorothiazide 320-25 MG Oral Tablet; TAKE 1 TABLET DAILY     Requested for: 66Enr5058; Last Rx:72Ylw4315 Ordered   13  Venlafaxine HCl ER 75 MG Oral Capsule Extended Release 24 Hour; Therapy: (Recorded:02Mar2016) to Recorded    Allergies    1  No Known Drug Allergies    Plan  CKD stage G3b/A3, GFR 30-44 and albumin creatinine ratio >300 mg/g    · (1) RENAL FUNCTION PANEL; Status:Active - Retrospective By Protocol Authorization; Requested GHD:[id number];    · (1) URINE PROTEIN CREATININE RATIO; Status:Active - Retrospective By Protocol  Authorization;  Requested UPM:[id number];     Signatures   Electronically signed by : CASIMIRO Juan ; Dec 15 2016  2:15PM EST

## 2018-01-12 NOTE — PROCEDURES
Procedures by Jayda Curry MD at 3/10/2017   7:40 AM      Author:  Jayda Curry MD Service:  Neurology Author Type:  Physician     Filed:  3/10/2017  8:39 AM Date of Service:  3/10/2017  7:40 AM Status:  Signed     :  Jayda Curry MD (Physician)            Continuous Video EEG Monitoring       Patient Name:  Edy Marshall  MRN: [id number]   :  1960 File #: Anil Camilo    Age: 60 y o  Encounter #: [id number]   Study Start: 3/9/2017 20:42  Study End: 3/10/2017 08:00           Report date: 3/10/2017          Study type: Continuous video EEG    ICD 10 diagnosis: Coma R40 2, cardiac arrest    -------------------------------------------------  Patient History: This recording was observed in a 60 y o  female to monitor for seizures in  an unresponsive patient following cardiac arrest      Medications include:     aspirin 325 mg Per OG Tube Daily   azithromycin 500 mg Intravenous Q24H   busPIRone 10 mg Oral TID   heparin (porcine)      pantoprazole 40 mg Intravenous Q24H [address]   piperacillin-tazobactam 2 25 g Intravenous Q8H   vancomycin 2,000 mg Intravenous Q12H       fentanyl 75 mcg/hr Last Rate: 75 mcg/hr (03/10/17 0444)   heparin (porcine) 3-20 Units/kg/hr (Order-Specific) Last Rate: 11 1 Units/kg/hr (03/10/17 0521)   insulin regular (HumuLIN R,NovoLIN R) infusion 10 Units/hr Last Rate: 11 Units/hr (03/10/17 0730)   multi-electrolyte 100 mL/hr Last Rate: Stopped (03/10/17 0315)   norepinephrine 1-30 mcg/min Last Rate: Stopped (03/10/17 0045)   propofol 5-50 mcg/kg/min Last Rate: 40 mcg/kg/min (03/10/17 0620)   sodium bicarbonate infusion 100 mL/hr Last Rate: 100 mL/hr (03/10/17 0407)   vasopressin (PITRESSIN) in 0 9 % sodium chloride 100 mL 0 04 Units/min        -------------------------------------------------  Description of Procedure:  32 channel digital recording with electrodes placed according to the International 10-20 system with additional T1/T2 electrodes,  EOG, EKG, and simultaneous video  A monitoring technologist supervised the continuous recording  The recording was technically satisfactory  -------------------------------------------------  Results:   Manual Review: The recording was obtained with the patient intubated and ventilated  No age or state appropriate activities were seen  Instead, background activities consisted  of unreactive, diffuse severe suppression  There were occasional brief very low to low amplitude bursts of delta activities lasting up to 1 second which were not seen during the later portions of recording  Other findings:  Samples of the single channel ECG demonstrated a regular rhythm  Events:   No push buttons were activated  -------------------------------------------------  Interpretation: This prolonged, continuous video-EEG recording is markedly abnormal  A background of unreactive  severe diffuse suppression, with infrequent brief low amplitude delta activities that are not seen later in the recording, indicates nonspecific severe diffuse cerebral dysfunction  No electrographic seizures or interictal epileptiform discharges  are seen  Miguel Truong MD   [address] [facility] [place] OLIVARES    Mar 10 2017  8:39AM Mandy Gómez Standard Time

## 2018-01-12 NOTE — RESULT NOTES
Message   A1c 10 6 improved from 11 7 but still not at goal, send in a log please for adjustments, is she dealing with another episode of cellulitis? Verified Results  (1) HEMOGLOBIN A1C [id number] 08:34AM Mindi Phlegm   TW Order Number: [id number]     Test Name Result Flag Reference   HEMOGLOBIN A1C 10 6 % H 4 0-5 6   EST  AVG  GLUCOSE 258 mg/dl     5 7-6 4% impaired fasting glucose  >=6 5% diagnosis of diabetes    Falsely low levels are seen in conditions linked to short RBC life span-  hemolytic anemia, and splenomegaly  Falsely elevated levels are seen in situations where there is an increased production of RBC- receipt of erythropoietin or blood transfusions  Adopted from ADA-Clinical Practice Recommendations     (1) COMPREHENSIVE METABOLIC PANEL 92IXO2282 16:56HF Mindi Phlegm   TW Order Number: [id number]  TW Order Number: [id number]     Test Name Result Flag Reference   GLUCOSE,RANDM 392 mg/dL H    If the patient is fasting, the ADA then defines impaired fasting glucose as > 100 mg/dL and diabetes as > or equal to 123 mg/dL  SODIUM 133 mmol/L L 136-145   POTASSIUM 4 9 mmol/L  3 5-5 3   CHLORIDE 100 mmol/L  100-108   CARBON DIOXIDE 24 mmol/L  21-32   ANION GAP (CALC) 9 mmol/L  4-13   BLOOD UREA NITROGEN 29 mg/dL H 5-25   CREATININE 1 84 mg/dL H 0 60-1 30   Standardized to IDMS reference method   CALCIUM 8 5 mg/dL  8 3-10 1   BILI, TOTAL 0 31 mg/dL  0 20-1 00   ALK PHOSPHATAS 145 U/L H    ALT (SGPT) 20 U/L  12-78   AST(SGOT) 18 U/L  5-45   ALBUMIN 2 8 g/dL L 3 5-5 0   TOTAL PROTEIN 6 7 g/dL  6 4-8 2   eGFR Non-African American 28 3 ml/min/1 73sq [facility] Disease Education Program recommendations are as follows:  GFR calculation is accurate only with a steady state creatinine  Chronic Kidney disease less than 60 ml/min/1 73 sq  meters  Kidney failure less than 15 ml/min/1 73 sq  meters         Signatures   Electronically signed by : Cheryl Manning, ; May 16 2016  8:24PM EST (Author)

## 2018-01-12 NOTE — MISCELLANEOUS
Message   Recorded as Task   Date: 11/01/2016 09:39 AM, Created By: Claude Avery   Task Name: Provider to Provider   Assigned To: Vanessa Verdugo   Regarding Patient: Solitario Hall, Status: In Progress   Serenity Vila - 01 Nov 2016 9:39 AM     TASK CREATED  labs reviewed, creatinine slightly worst   metformin and farxiga should be dc (I sent note to endo about it)  spoke with patient, needs better blood sugar control, will repeat labs in 6 weeks - [facility] mail orders for a BMP in 6 weeks  she has an appt with cardiology in 2 weeks    thanksOsbaldo - 01 Nov 2016 10:08 AM     TASK IN PROGRESS       Lab slip mailed to the patient    AG      Active Problems    1  Anxiety (300 00) (F41 9)   2  Benign essential hypertension (401 1) (I10)   3  CKD stage G3b/A3, GFR 30-44 and albumin creatinine ratio >300 mg/g (585 3) (N18 3)   4  Depression (311) (F32 9)   5  Hypercholesterolemia (272 0) (E78 00)   6  Hyperlipidemia (272 4) (E78 5)   7  Morbid obesity with BMI of 40 0-44 9, adult (278 01,V85 41) (E66 01,Z68 41)   8  Obstructive sleep apnea (327 23) (G47 33)   9  Type 2 diabetes mellitus with hyperglycemia (250 00) (E11 65)   10  Uncontrolled type 2 diabetes mellitus with stage 3 chronic kidney disease, with long-term    current use of insulin (250 52,585 3,V58 67) (E11 22,E11 65,N18 3,Z79  4)    Current Meds   1  AmLODIPine Besylate 10 MG Oral Tablet; TAKE 1 TABLET DAILY AS DIRECTED; Therapy: (Recorded:43Fci7249) to Recorded   2  Atorvastatin Calcium 20 MG Oral Tablet; TAKE 1 TABLET AT BEDTIME; Therapy: (Recorded:93Gwo6823) to Recorded   3  BD Insulin Syringe U-100 1 ML Miscellaneous; USE 1 DAILY; Therapy: 86KFC3078 to (Evaluate:12Tds0785)  Requested for: 97Eld4717; Last   Rx:72Thj8634 Ordered   4  BD Pen Needle Mariana U/F 32G X 4 MM Miscellaneous; USE 3 TIMES A DAY; Therapy: 91JOS9917 to (Evaluate:58Xhj8979)  Requested for: 18Kti0001; Last   Rx:32Ieg4927 Ordered   5   Bumetanide 0 5 MG Oral Tablet; TAKE 1 TABLET ONCE DAILY; Therapy: (Maral Reyes) to Recorded   6  ClonazePAM 1 MG Oral Tablet; Therapy: (Recorded:02Mar2016) to Recorded   7  Doxepin HCl - 50 MG Oral Capsule; Therapy: (Recorded:02Mar2016) to Recorded   8  Lantus SoloStar 100 UNIT/ML Subcutaneous Solution Pen-injector; Take 65 units daily; Therapy: (Jarvis Diaz) to  Requested for: 21NCW0387 Recorded   9  LORazepam TABS; Therapy: (Recorded:25Oct2016) to Recorded   10  Mirtazapine 15 MG Oral Tablet; Therapy: (Recorded:02Mar2016) to Recorded   11  NovoLOG FlexPen 100 UNIT/ML Subcutaneous Solution Pen-injector; 24 units before    breakfast 22 units before lunch  18 units before supper; Therapy: 05Hbz9911 to (Evaluate:24Apr2017)  Requested for: 26Oct2016; Last    Rx:26Oct2016 Ordered   12  RisperiDONE 1 MG Oral Tablet; TAKE 1 TABLET TWICE DAILY; Therapy: (Maral Baredebi) to Recorded   13  Valsartan-Hydrochlorothiazide 320-25 MG Oral Tablet; TAKE 1 TABLET DAILY     Requested for: 30Hhc1282; Last Rx:12Yzf7550 Ordered   14  Venlafaxine HCl ER 75 MG Oral Capsule Extended Release 24 Hour; Therapy: (Recorded:02Mar2016) to Recorded    Allergies    1  No Known Drug Allergies    Plan  Benign essential hypertension    · (1) BASIC METABOLIC PROFILE; Status:Active - Retrospective By Protocol  Authorization;  Requested for:14Kkb5518;     Signatures   Electronically signed by : CASIMIRO Laughlin ; Nov 2 2016  5:00PM EST

## 2018-01-12 NOTE — PROCEDURES
Procedures by Aman Hightower MD  at 3/16/2017  2:05 PM      Author:  Aman Hightower MD Service:  Neurology Author Type:  Physician     Filed:  3/16/2017  2:26 PM Date of Service:  3/16/2017  2:05 PM Status:  Signed     :  Aman Hightower MD (Physician)            Continuous Video EEG Monitoring       Patient Name:  Shyla Medina  MRN: [id number]   :  1960 File #: Kely Mg    Age: 60 y o  Encounter #: [id number]   Date Performed: 3/15/2017 to 3/16/2017    Report date: 3/16/2017          Study type: Continuous video EEG    ICD 10 diagnosis: Anoxic encephalopathy    Start time: 13:17 on 3/15/17 End time: 08:00 on 3/16/17     -------------------------------------------------------------------------------------------------------------------   Patient History:  60year old female with  diabetes and hypertension who presented in cardiac arrest on 17  She had some sputum production and chest pain which was worsening  Patient came to ED and lost consciousness while in the parking lot  CPR was administered for 15-20 minutes  She had 24 hrs of induced hypothermia and has been warmed since  Was previously monitored on EEG which was abnormal with a background of unreactive  severe diffuse suppression with interictal discharges  Had a witnessed episode of generalized shaking, so LTM was requested to check for electrographic seizures      Medications include:   amLODIPine 10 mg Oral Daily   artificial tear  Both Eyes HS   aspirin 325 mg Per OG Tube Daily   atorvastatin 40 mg Oral Daily With Dinner   carvedilol 6 25 mg Oral BID With Meals   cefazolin 1,000 mg Intravenous Q12H   chlorhexidine 15 mL Swish & Spit Q12H [facility]   heparin (porcine) 5,000 Units Subcutaneous Q8H [facility]   insulin glargine 12 Units Subcutaneous Q24H   levETIRAcetam 1,000 mg Intravenous Q12H [facility]   metoclopromide 5 mg Intravenous Q8H   metroNIDAZOLE 500 mg Intravenous Q8H   pantoprazole 40 mg Intravenous Q24H GUIDO   potassium chloride 40 mEq Oral TID (diuretic)       -------------------------------------------------------------------------------------------------------------------   Description of Procedure:  ·  32 channel digital recording with electrodes placed according to the International 10-20 system with additional  T1/T2 electrodes, EOG, EKG, and simultaneous  video  A monitoring technologist supervised the continuous recording  The recording was technically satisfactory  -------------------------------------------------------------------------------------------------------------------   Results:   Background Activity:   When viewed at the standard sensitivity of 7 microvolts per millimeter, no EEG activity is seen at any point during this recording  Activation Procedures:   Neither hyperventilation nor photic stimulation was performed  Abnormal Findings:  See Background Activity  No focal abnormalities or interictal epileptiform discharges were noted  No electrographic seizures were observed during this recording  Other findings: The single lead ECG demonstrated a regular rhythm  Events:   No push buttons were activated  -------------------------------------------------------------------------------------------------------------------   Interpretation:   Markedly abnormal 18  hour continuous video-EEG recording, due to complete absence of EEG activity  when viewed at standard settings  Note that this study was not performed to assess for electrocerebral silence, so the electrocerebral silence protocol was not initiated  Moreover,  a great deal of muscle artifact was present during the recording which could, theoretically, obscure EEG activity  No focal abnormalities nor interictal epileptiform discharges were noted  No electrographic seizures occurred during the recording      If the presence or absence of background EEG activity is of clinical significance, the patient can be administered a brief paralytic to eliminate muscle artifact  Results discussed with Dr Acuna Pod:  Documented by Sona Morrell, acting as a scribe for Dr Ryan Cortez on 3/16/17 at  2:26 PM     Physician Attestation:  All documentation recorded by the scribe accurately reflects the service I personally performed and the decisions made by me  I was present during the time the encounter was recorded and have reviewed all the documentation and confirm that it is all accurate  and representative of the encounter  Gerardo Rush MD   Medical Director  [address] [place] [address] [facility]  Pager # [phone] OLIVARES    Mar 16 2017  2:26PM [facility] Standard Time

## 2018-01-13 NOTE — PROGRESS NOTES
Message  Patient called to schedule appointment with SW and RD  Appointment scheduled for [email or web address]  [place]      Active Problems    1  Anxiety (300 00) (F41 9)   2  Benign essential hypertension (401 1) (I10)   3  CKD stage G3b/A3, GFR 30-44 and albumin creatinine ratio >300 mg/g (585 3) (N18 3)   4  Depression (311) (F32 9)   5  GARCIA (dyspnea on exertion) (786 09) (R06 09)   6  Hypercholesterolemia (272 0) (E78 00)   7  Hyperlipidemia (272 4) (E78 5)   8  Hypertriglyceridemia (272 1) (E78 1)   9  Morbid obesity with BMI of 40 0-44 9, adult (278 01,V85 41) (E66 01,Z68 41)   10  Nephrotic range proteinuria (791 0) (R80 9)   11  Obstructive sleep apnea (327 23) (G47 33)   12  Palpitations (785 1) (R00 2)   13  Pre-procedural cardiovascular examination (V72 81) (Z01 810)   14  Secondary hyperparathyroidism (588 81) (N25 81)   15  Type 2 diabetes mellitus with hyperglycemia (250 00) (E11 65)   16  Uncontrolled type 2 diabetes mellitus with stage 3 chronic kidney disease, with long-term    current use of insulin (250 52,585 3,V58 67) (E11 22,E11 65,N18 3,Z79  4)    Current Meds   1  AmLODIPine Besylate 10 MG Oral Tablet; TAKE 1 TABLET DAILY AS DIRECTED; Therapy: (Recorded:01Hql1793) to Recorded   2  Atorvastatin Calcium 40 MG Oral Tablet; Take 1 tablet by mouth at bedtime; Therapy: 56MDO7421 to (Evaluate:03Ldp9949)  Requested for: 37XXJ0692; Last   Rx:95Zrz4996 Ordered   3  BD Insulin Syringe U-100 1 ML Miscellaneous; USE 1 DAILY; Therapy: 24QYM8407 to (Evaluate:79Pml3981)  Requested for: 45Wtg6408; Last   Rx:14Fno3700 Ordered   4  BD Pen Needle Mariana U/F 32G X 4 MM Miscellaneous; Using 4 needles daily as directed; Therapy: 37WYP4542 to (Evaluate:43Ftl7980)  Requested for: 46NUU3583; Last   Rx:42Sof3877 Ordered   5  ClonazePAM 1 MG Oral Tablet; TAKE 1 TABLET EVERY 12 HOURS AS NEEDED; Therapy: (Recorded:62Uub7123) to Recorded   6  Doxepin HCl - 50 MG Oral Capsule; TAKE 1 CAPSULE AT BEDTIME;    Therapy: (Recorded:71Yiq0195) to Recorded   7  HumuLIN R U-500 KwikPen 500 UNIT/ML Subcutaneous Solution Pen-injector; INJECT   40 UNIT Every 6 hours; Therapy: 12ACZ3468 to (Last Rx:16Gjy9342)  Requested for: 37AKM4175 Ordered   8  LORazepam 0 5 MG Oral Tablet; take 1 tablet daily as needed Recorded   9  Mirtazapine 15 MG Oral Tablet; TAKE 1 TABLET AT BEDTIME; Therapy: (Recorded:02Feb2017) to Recorded   10  RisperiDONE 1 MG Oral Tablet; TAKE 1 TABLET TWICE DAILY; Therapy: (Recorded:03Aug2016) to Recorded   11  Valsartan-Hydrochlorothiazide 320-25 MG Oral Tablet; TAKE 1 TABLET DAILY     Requested for: 61Jbp9731; Last Rx:27Owa8039 Ordered   12  Venlafaxine HCl ER 75 MG Oral Capsule Extended Release 24 Hour; Therapy: (Recorded:02Mar2016) to Recorded    Allergies    1   No Known Drug Allergies    Signatures   Electronically signed by : Alba Watson MSWLCSW; Feb 15 2017  8:44AM EST                       (Author)

## 2018-01-13 NOTE — RESULT NOTES
Message   Hemoglobin A1c  is trending downward from October 2016 from 12 6 to 11 2  Continue medication along with changes to diet and send in blood sugar readings every 2 weeks for adjustments  Verified Results  (1) HEMOGLOBIN A1C 28Jan2017 10:23AM Mary Jo Guan Order Number: [id number]     Test Name Result Flag Reference   HEMOGLOBIN A1C 11 2 % H 4 2-6 3   EST  AVG   GLUCOSE 275 mg/dl

## 2018-01-14 VITALS
BODY MASS INDEX: 40.27 KG/M2 | WEIGHT: 250.56 LBS | HEIGHT: 66 IN | HEART RATE: 92 BPM | SYSTOLIC BLOOD PRESSURE: 138 MMHG | DIASTOLIC BLOOD PRESSURE: 76 MMHG

## 2018-01-14 NOTE — PROGRESS NOTES
History of Present Illness  Bariatric MNT [facility] Screening Preop [facility]: Patient is Icelandic speaking  Jeanne Grain LCSW presents and interpreting for RD  Assess: No changes on Dx or Rx noted  Saw endocrinology today  reports morning blood sugar to be 170-200mg/dL 24hr recall: 9a B: 2 eggs, 1 slice plain bread, water, 15oz coffee with 2 pkts splenda and powdered creamer 12p L: chicken soup with vegetables (carrots, green beans, corn- boyfriend cooks), water 5:30-6p D: leftovers from lunch 7:30p s: tuna fish sandwich, 8oz coffee Drinking >60oz water daily walking 20 minutes daily Diagnose: Overweight/Obesity related to positive energy balance as evidenced by BMI >30 and pt's self-reported energy intake  (Continued) Intervene: Goals: 1  continue to check blood sugar 4 times per day as directed by endocrinologist 2  continue to use small plate and measure foods 3  continue with daily physical activity Materials provided:none Reviewed workflow with pt: yes with LCSW Pt verbalized understanding, no further questions at present, expect good compliance  Monitor/Evaluate: Will monitor food journals, weight, pt's reported compliance with goals at next appointment  Follow-up scheduled for: 2 weeks with Isamar Lopez and Jeanne Grain JUAN CW      Active Problems    1  Anxiety (300 00) (F41 9)   2  Benign essential hypertension (401 1) (I10)   3  CKD stage G3b/A3, GFR 30-44 and albumin creatinine ratio >300 mg/g (585 3) (N18 3)   4  Depression (311) (F32 9)   5  GARCIA (dyspnea on exertion) (786 09) (R06 09)   6  Hypercholesterolemia (272 0) (E78 00)   7  Hyperlipidemia (272 4) (E78 5)   8  Hypertriglyceridemia (272 1) (E78 1)   9  Morbid obesity with BMI of 40 0-44 9, adult (278 01,V85 41) (E66 01,Z68 41)   10  Obstructive sleep apnea (327 23) (G47 33)   11  Palpitations (785 1) (R00 2)   12  Pre-procedural cardiovascular examination (V72 81) (Z01 810)   13   Type 2 diabetes mellitus with hyperglycemia (250 00) (E11 65)   14  Uncontrolled type 2 diabetes mellitus with stage 3 chronic kidney disease, with    long-term current use of insulin (250 52,585 3,V58 67) (E11 22,E11 65,N18 3,Z79  4)    Surgical History    1  History of Blood Transfusion (___ Ml)   2  History of Total Abdominal Hysterectomy    Family History  Mother    1  Family history of CAD, multiple vessel  Father    2  Family history of CAD, multiple vessel  Family History    3  Family history of essential hypertension (V17 49) (Z82 49)   4  Family history of Other specified diabetes mellitus without complications    Social History    · Current every day smoker (305 1) (F17 200)   · No alcohol use   · No caffeine use   · No drug use   · Recently stopped smoking    Current Meds   1  AmLODIPine Besylate 10 MG Oral Tablet; TAKE 1 TABLET DAILY AS DIRECTED; Therapy: (Recorded:85Jer8750) to Recorded   2  Atorvastatin Calcium 40 MG Oral Tablet; TAKE 1 TABLET AT BEDTIME  Requested for:   66KQF0148; Last Rx:10Nov2016 Ordered   3  BD Insulin Syringe U-100 1 ML Miscellaneous; USE 1 DAILY; Therapy: 53APD1949 to (Evaluate:94Rmb7123)  Requested for: 49Iuh2951; Last   Rx:04Mxq0111 Ordered   4  BD Pen Needle Mariana U/F 32G X 4 MM Miscellaneous; Using 4 needles daily as directed; Therapy: 64DAU7507 to (Evaluate:92Ehe6425)  Requested for: 22VCK4553; Last   Rx:88Gdo6503 Ordered   5  ClonazePAM 1 MG Oral Tablet; Therapy: (Recorded:02Mar2016) to Recorded   6  Doxepin HCl - 50 MG Oral Capsule; Therapy: (Recorded:02Mar2016) to Recorded   7  HumuLIN R U-500 KwikPen 500 UNIT/ML Subcutaneous Solution Pen-injector; INJECT   40 UNIT Every 6 hours; Therapy: 42OMK8676 to (Last Rx:30Yeo8420)  Requested for: 45UOH5097 Ordered   8  LORazepam TABS; Therapy: (Recorded:25Oct2016) to Recorded   9  Mirtazapine 15 MG Oral Tablet; Therapy: (Recorded:02Mar2016) to Recorded   10  RisperiDONE 1 MG Oral Tablet; TAKE 1 TABLET TWICE DAILY; Therapy: (Recorded:94Xxg2493) to Recorded   11  Valsartan-Hydrochlorothiazide 320-25 MG Oral Tablet; TAKE 1 TABLET DAILY  Requested    for: 30Ozn3567; Last Rx:88Nso7004 Ordered   12  Venlafaxine HCl ER 75 MG Oral Capsule Extended Release 24 Hour; Therapy: (Recorded:02Mar2016) to Recorded    Allergies    1  No Known Drug Allergies    Future Appointments    Date/Time Provider Specialty Site   02/02/2017 11:00 AM CASIMIRO Mayers   Nephrology [address]   04/27/2017 02:15 PM Tia Groves, 10 [facility] Endocrinology [address] ENDOCRINOLOGY     Signatures   Electronically signed by : LIZBETH Alexis; Jan 26 2017  3:37PM EST                       (Author)    Electronically signed by : CASIMIRO Aguayo ; Jan 27 2017 11:52AM EST                       (Validation)

## 2018-01-15 VITALS
DIASTOLIC BLOOD PRESSURE: 86 MMHG | WEIGHT: 253 LBS | HEART RATE: 80 BPM | BODY MASS INDEX: 40.66 KG/M2 | SYSTOLIC BLOOD PRESSURE: 144 MMHG | HEIGHT: 66 IN

## 2018-01-15 NOTE — PROGRESS NOTES
Plan    1  DSMT/MNT Time Record; Status:Complete;   Done: 49JEI9645 02:43PM    Discussion/Summary    PATIENT EDUCATION RECORD   Indication for Services: type 2 Diabetes Mellitus  She is ready to learn  She has linguistic ( speaks South African speaking ) barriers to learning  Healthy Eating:   Discussed general nutrition topics: Method: Instruction  Response: Verbalizes Understanding   Discussed importance of meal timing/consistency: Method: Instruction  Response: Verbalizes Understanding   Discussed nutrient types ( Cho/Fat/Protein): Method: Instruction and Handout  Response: Verbalizes Understanding   Discussed portion sizes: Method: Instruction and Handout  Response: Verbalizes Understanding  Provided food diary and instructions on use: Method: Instruction and HandoutResponse: Verbalizes Understanding   Provided meal planning: Method: Instruction  Response: Verbalizes Understanding Her current weight is 256  Her keal needs are 9240-1706 calories  Her CHO's per meal are 45 grams  He/She was provided a meal plan for: fixed carbohydrates and weight loss  Discussed weight management/weight loss: Method: Instruction  Response: Verbalizes Understanding Her weight goal is Lose 10% of present body weight  Discussed fat intake and choices: Method: Instruction and Handout  Response: Verbalizes Understanding   Discussed basic carbohydrate counting: Method: Instruction and Handout  Response: Verbalizes Understanding   Being Active:   Stated the benefits of exercise: Method: Instruction  Response: Verbalizes Understanding   Discussed "exercise guidelines": Method: Instruction  Response: Verbalizes Understanding She was given the following educational materials: portion book and Calorie and Carbohydrate Tracking Books/Websites/Phone Apps   Chief Complaint  Patient with T2DM seen for diet consult per MD request       History of Present Illness   479609 was used for this visit   Patient reports taking classes for bariatric surgery ~9-10 months ago  She reports her weight was down to 209 pounds at that time  Shane Zaldivar states, "The psychiatrist said I couldn't get the surgery  I gained the weight back"  Problems identified in food recall include inconsistent carbohydrate intake at meals, large portions, excess calories coming from between meal snacking and added fat, low intake of plant based foods, whole grains and low fat dairy and general lack of balance  Provided patient with a American portions booklet and advised her to keep her carbohydrate intake to 3 choices per meal (45 grams)  Explained that would be equivalent to her having a sandwich on 2 slices of bread and 1 cup of soup at lunch and limiting rice intake to 1 cup at dinner  Patient was also encouraged to limit her HS snack to 1 slice of toast with 1 tsp of butter  Patient was agreeable to making the discussed changes  She was encouraged to initiate aerobic exercise and work her way up to 150 minutes a week  Shane Zaldivar agreed to keep daily food logs  She will return for follow-up on 4/20/2016  RD will remain available for further dietary questions/concerns  This is her initial assessment , S O  Present at session: patient    Medical Diagnosis/Reason for Referral:T2DM  She has special learning needs: American speaking  Her caloric needs are 8599-0530 calories   Recent weight change: ~50 pound weight gain in the past 9 months  Patient  shops for food  Patient  and S O   cooks the food  Exercise routine:  Patient does not exercise beyond her daily activities     She eats breakfast at  9-9:30AM AM 2 boiled eggs or scrambled eggs, 2 corn tortilla's, 1 tsp butter, coffee with 1 tblsp milk and artificial sweetener   She snacks at 32-58:66WV AM 2 slices of oatmeal bread, turkey, 1 tblsp mayonnaise, lettuce and tomato, coffee   She eats lunch at  12:30-1:00PM PM 3 cups chicken noodle soup, water   She snacks at 6:00PM PM 3 cups rice, 3oz chicken, 1/2 cup green beans or legumes or corn or peas, water   She eats dinner at  5-0:19HQ PM 2 slices toast (oatmeal bread), 1 tblsp butter, water      NUTRITION DIAGNOSES   Excess Intake Carbs   Excess carbohydrate intake related to: Food and nutrition related knowledge deficit concerning appropriate amount of carbohydrate intake  As evidenced by: Estimated carbohydrate intake that is consistently more than recommended amounts   Medical Nutrition Therapy Intervention: Plate Method, Carbohydrate counting, Meal planning, Strategies to reduce fat intake, Strategies to increase the intake of plant based foods, Strategies to monitor portion control, Meal timing, Exercise Guidelines, Behavior modification strategies and Weight/BMI Goals  Her comprehension was good   Her motivation was good   Her compliance was good   Goals:  1  Egg whites for breakfast   2  Eliminate morning snack and go for a walk instead  3  Keep lunch to 1 sandwich and 1 cup of soup  4  Keep dinner to 1 cup of rice and 1/2 cup vegetable of choice  Current Meds   1  BD Insulin Syringe U-100 1 ML Miscellaneous; USE 1 DAILY; Therapy: 41DZX4088 to (Evaluate:24May2016)  Requested for: 24Feb2016; Last   Rx:24Feb2016 Ordered   2  BD Pen Needle Mariana U/F 32G X 4 MM Miscellaneous; USE 3 TIMES A DAY; Therapy: 46WXK2398 to (Evaluate:24May2016)  Requested for: 24Feb2016; Last   Rx:24Feb2016 Ordered   3  ClonazePAM 1 MG Oral Tablet; Therapy: (Recorded:02Mar2016) to Recorded   4  Doxepin HCl - 50 MG Oral Capsule; Therapy: (Recorded:02Mar2016) to Recorded   5  Lantus SoloStar 100 UNIT/ML Subcutaneous Solution Pen-injector; Take 60 units daily; Therapy: (Recorded:02Mar2016) to Recorded   6  MetFORMIN HCl - 1000 MG Oral Tablet; Take 1 tab BID; Therapy: (Recorded:24Feb2016) to Recorded   7  Mirtazapine 15 MG Oral Tablet; Therapy: (Recorded:02Mar2016) to Recorded   8  NovoLOG FlexPen 100 UNIT/ML Subcutaneous Solution Pen-injector;  Inject SC 20 units   before breakfast, lunch and dinner; Last Rx:12Xgp0613 Ordered   9  RisperiDONE 1 MG Oral Tablet; Therapy: (Recorded:02Mar2016) to Recorded   10  Valsartan-Hydrochlorothiazide 160-25 MG Oral Tablet; Therapy: (Recorded:02Mar2016) to Recorded   11  Venlafaxine HCl ER 75 MG Oral Capsule Extended Release 24 Hour;     Therapy: (Recorded:02Mar2016) to Recorded    Vitals  Signs [Data Includes: Current Encounter]   Recorded: 08CEY3410 02:46PM   Weight: 256 lb   BMI Calculated: 41 32  BSA Calculated: 2 22    Results/Data  Encounter Results   DSMT/MNT Time Record 64NCH2287 02:43PM Anupam Lopez     Test Name Result Flag Reference   Date of Service 3/9/2016     Start - Stop Time 1:30-2:15PM     Total MInutes 45 minutes     Group Or Individual Instruction MNT-I       Future Appointments    Date/Time Provider Specialty Site   04/13/2016 11:15 AM Remberto Warner Endocrinology [facility] ENDOCRINOLOGY   04/20/2016 11:00 AM Anupam Lopez RD, LDN Diabetes Educator [facility] ENDOCRINOLOGY     Signatures   Electronically signed by : Doreen Palomares [facility]; Mar  9 2016  3:42PM EST                       (Author)    Electronically signed by : CASIMIRO Walter ; Mar 10 2016 12:25PM EST

## 2018-01-16 NOTE — PROGRESS NOTES
Active Problems    1  Anxiety (300 00) (F41 9)   2  Benign essential hypertension (401 1) (I10)   3  CKD stage G3b/A3, GFR 30-44 and albumin creatinine ratio >300 mg/g (585 3) (N18 3)   4  Depression (311) (F32 9)   5  GARCIA (dyspnea on exertion) (786 09) (R06 09)   6  Hypercholesterolemia (272 0) (E78 00)   7  Hyperlipidemia (272 4) (E78 5)   8  Hypertriglyceridemia (272 1) (E78 1)   9  Morbid obesity with BMI of 40 0-44 9, adult (278 01,V85 41) (E66 01,Z68 41)   10  Obstructive sleep apnea (327 23) (G47 33)   11  Palpitations (785 1) (R00 2)   12  Pre-procedural cardiovascular examination (V72 81) (Z01 810)   13  Type 2 diabetes mellitus with hyperglycemia (250 00) (E11 65)   14  Uncontrolled type 2 diabetes mellitus with stage 3 chronic kidney disease, with    long-term current use of insulin (250 52,585 3,V58 67) (E11 22,E11 65,N18 3,Z79  4)    Surgical History    1  History of Blood Transfusion (___ Ml)   2  History of Total Abdominal Hysterectomy    Family History  Mother    1  Family history of CAD, multiple vessel  Father    2  Family history of CAD, multiple vessel  Family History    3  Family history of essential hypertension (V17 49) (Z82 49)   4  Family history of Other specified diabetes mellitus without complications    Social History    · Current every day smoker (305 1) (F17 200)   · No alcohol use   · No caffeine use   · No drug use   · Recently stopped smoking    Current Meds   1  AmLODIPine Besylate 10 MG Oral Tablet; TAKE 1 TABLET DAILY AS DIRECTED; Therapy: (Recorded:69Xcf4631) to Recorded   2  Atorvastatin Calcium 40 MG Oral Tablet; TAKE 1 TABLET AT BEDTIME  Requested for:   01LMR7021; Last Rx:10Nov2016 Ordered   3  BD Insulin Syringe U-100 1 ML Miscellaneous; USE 1 DAILY; Therapy: 35LPP4288 to (Evaluate:43Lgw2979)  Requested for: 96Hpa2589; Last   Rx:24Feb2016 Ordered   4  BD Pen Needle Mariana U/F 32G X 4 MM Miscellaneous; USE 3 TIMES A DAY;    Therapy: 01USI4256 to (Evaluate:95Men8400) Requested for: 86Vik8895; Last   Rx:73Dis1388 Ordered   5  ClonazePAM 1 MG Oral Tablet; Therapy: (Recorded:02Mar2016) to Recorded   6  Doxepin HCl - 50 MG Oral Capsule; Therapy: (Recorded:02Mar2016) to Recorded   7  Lantus SoloStar 100 UNIT/ML Subcutaneous Solution Pen-injector; ADMINISTER 65   UNITS UNDER THE SKIN DAILY; Therapy: 12AXV5857 to (Evaluate:57Phf6293)  Requested for: 97NDG1954; Last   Rx:82Swu7487 Ordered   8  LORazepam TABS; Therapy: (Recorded:43Oyr5689) to Recorded   9  Mirtazapine 15 MG Oral Tablet; Therapy: (Recorded:02Mar2016) to Recorded   10  NovoLOG FlexPen 100 UNIT/ML Subcutaneous Solution Pen-injector; 24 units before    breakfast 22 units before lunch  18 units before supper; Therapy: 54Apq4738 to (Evaluate:41Qhg2906)  Requested for: 26Oct2016; Last    Rx:86Gws9230 Ordered   11  RisperiDONE 1 MG Oral Tablet; TAKE 1 TABLET TWICE DAILY; Therapy: (Recorded:93Jho3968) to Recorded   12  Valsartan-Hydrochlorothiazide 320-25 MG Oral Tablet; TAKE 1 TABLET DAILY  Requested    for: 21Tlo6263; Last Rx:95Fne8614 Ordered   13  Venlafaxine HCl ER 75 MG Oral Capsule Extended Release 24 Hour; Therapy: (Recorded:02Mar2016) to Recorded    Allergies    1  No Known Drug Allergies     Note   Note:   Met with patient and discussed the following: patient lost weight since last appointment and admits she is not reading manual  I discussed the importance of patient demonstrating to herself she is preparing to embark on a life style change  I have my reservations regarding her mental health status although she was cleared by psychiatrist  She denies hearing voices- last incident- in 2013 when not treated  At this time continue to meet biweekly for weigh in, assess compliance, comprehension and mental health status  Next appointment [email or web address]  [place]      Future Appointments    Date/Time Provider Specialty Site   01/31/2017 11:10 AM CASIMIRO Rivera   [facility]   12/20/2016 11:00 AM Chasity Ulloa DO Card[facility]     Signatures   Electronically signed by : BEVERLY Guevara; Nov 29 2016 10:49AM EST                       (Author)    Electronically signed by : CASIMIRO Dillard ; Dec  1 2016  4:51PM EST                       (Validation)

## 2018-01-17 NOTE — PROGRESS NOTES
Active Problems    1  Anxiety (300 00) (F41 9)   2  Benign essential hypertension (401 1) (I10)   3  CKD stage G3b/A3, GFR 30-44 and albumin creatinine ratio >300 mg/g (585 3) (N18 3)   4  Depression (311) (F32 9)   5  GARCIA (dyspnea on exertion) (786 09) (R06 09)   6  Hypercholesterolemia (272 0) (E78 00)   7  Hyperlipidemia (272 4) (E78 5)   8  Hypertriglyceridemia (272 1) (E78 1)   9  Morbid obesity with BMI of 40 0-44 9, adult (278 01,V85 41) (E66 01,Z68 41)   10  Obstructive sleep apnea (327 23) (G47 33)   11  Palpitations (785 1) (R00 2)   12  Pre-procedural cardiovascular examination (V72 81) (Z01 810)   13  Type 2 diabetes mellitus with hyperglycemia (250 00) (E11 65)   14  Uncontrolled type 2 diabetes mellitus with stage 3 chronic kidney disease, with    long-term current use of insulin (250 52,585 3,V58 67) (E11 22,E11 65,N18 3,Z79  4)    Surgical History    1  History of Blood Transfusion (___ Ml)   2  History of Total Abdominal Hysterectomy    Family History  Mother    1  Family history of CAD, multiple vessel  Father    2  Family history of CAD, multiple vessel  Family History    3  Family history of essential hypertension (V17 49) (Z82 49)   4  Family history of Other specified diabetes mellitus without complications    Social History    · Current every day smoker (305 1) (F17 200)   · No alcohol use   · No caffeine use   · No drug use   · Recently stopped smoking    Current Meds   1  AmLODIPine Besylate 10 MG Oral Tablet; TAKE 1 TABLET DAILY AS DIRECTED; Therapy: (Recorded:80Wwf6949) to Recorded   2  Atorvastatin Calcium 40 MG Oral Tablet; TAKE 1 TABLET AT BEDTIME  Requested for:   91TWV6635; Last Rx:10Nov2016 Ordered   3  BD Insulin Syringe U-100 1 ML Miscellaneous; USE 1 DAILY; Therapy: 95AOM6236 to (Evaluate:52Rqn9518)  Requested for: 24Feb2016; Last   Rx:24Feb2016 Ordered   4  BD Pen Needle Mariana U/F 32G X 4 MM Miscellaneous; Using 4 needles daily as directed;    Therapy: 24Feb2016 to (Evaluate:28Nop4121)  Requested for: 31MJH2901; Last   Rx:00Fyv5151 Ordered   5  ClonazePAM 1 MG Oral Tablet; Therapy: (Recorded:02Mar2016) to Recorded   6  Doxepin HCl - 50 MG Oral Capsule; Therapy: (Recorded:02Mar2016) to Recorded   7  HumuLIN R U-500 KwikPen 500 UNIT/ML Subcutaneous Solution Pen-injector; INJECT   40 UNIT Every 6 hours; Therapy: 01IQY9977 to (Last Rx:14Eqb3488)  Requested for: 15PTT9483 Ordered   8  LORazepam TABS; Therapy: (Recorded:25Oct2016) to Recorded   9  Mirtazapine 15 MG Oral Tablet; Therapy: (Recorded:02Mar2016) to Recorded   10  RisperiDONE 1 MG Oral Tablet; TAKE 1 TABLET TWICE DAILY; Therapy: (Recorded:28Yjg7408) to Recorded   11  Valsartan-Hydrochlorothiazide 320-25 MG Oral Tablet; TAKE 1 TABLET DAILY  Requested    for: 21Jnp6845; Last Rx:67Gyl2219 Ordered   12  Venlafaxine HCl ER 75 MG Oral Capsule Extended Release 24 Hour; Therapy: (Recorded:02Mar2016) to Recorded    Allergies    1  No Known Drug Allergies    Vitals  Signs   Recorded: 15TIX0072 09:11AM   Height: 5 ft 6 in  Weight: 253 lb 2 oz  BMI Calculated: 40 86  BSA Calculated: 2 21     Note   Note:   Met with patient and discussed the following; weight loss, recent hospitalization ( [facility]) for high sugar readings  ( over 600) and poor "oxygen readings"  Reviewed workflow; patient must provide letters from PCP and nephrology  Patient said she walks while at home however is not reading manual  She said she has a difficult time "recalling" what she reads  We discussed strategies( reading with boyfriend, reading out loud)  Patient will read manual ( chapters 1&2) and continue walking  Next appointment for [email or web address] with LATONYA and MOISÉS  [place]      Future Appointments    Date/Time Provider Specialty Site   01/13/2017 01:00 PM Salud Cota RD Diabetes Educator [facility]   02/02/2017 11:00 AM CASIMIRO Moraes   Nephrology [address] 01/26/2017 01:30 PM Sima Hobbs, [facility] Endocrinology [facility] ENDOCRINOLOGY     Signatures   Electronically signed by : Addison Bell MSWLCSW; Dec 30 2016  9:29AM EST                       (Author)    Electronically signed by : CASIMIRO Ortiz ; Dec 30 2016 11:09AM EST                       (Validation)

## 2018-01-17 NOTE — PROGRESS NOTES
Message  Outreach phone call; no response but left message  We agreed she would provide psychiatric contact information and she has not  Please return phone call  [place]      Active Problems    1  Anxiety (300 00) (F41 9)   2  Benign essential hypertension (401 1) (I10)   3  CKD stage G3b/A3, GFR 30-44 and albumin creatinine ratio >300 mg/g (585 3) (N18 3)   4  Depression (311) (F32 9)   5  Hypercholesterolemia (272 0) (E78 00)   6  Hyperlipidemia (272 4) (E78 5)   7  Morbid obesity with BMI of 40 0-44 9, adult (278 01,V85 41) (E66 01,Z68 41)   8  Obstructive sleep apnea (327 23) (G47 33)   9  Type 2 diabetes mellitus with hyperglycemia (250 00) (E11 65)   10  Uncontrolled type 2 diabetes mellitus with stage 3 chronic kidney disease, with long-term    current use of insulin (250 52,585 3,V58 67) (E11 22,E11 65,N18 3,Z79  4)    Current Meds   1  AmLODIPine Besylate 10 MG Oral Tablet; TAKE 1 TABLET DAILY AS DIRECTED; Therapy: (Recorded:54Irm4906) to Recorded   2  Atorvastatin Calcium 20 MG Oral Tablet; TAKE 1 TABLET AT BEDTIME; Therapy: (Recorded:62Fqp9351) to Recorded   3  BD Insulin Syringe U-100 1 ML Miscellaneous; USE 1 DAILY; Therapy: 94SFU8306 to (Evaluate:41Qom6706)  Requested for: 46Ezr0575; Last   Rx:06Ukz6720 Ordered   4  BD Pen Needle Mariana U/F 32G X 4 MM Miscellaneous; USE 3 TIMES A DAY; Therapy: 90MPJ5462 to (Evaluate:41Sej0268)  Requested for: 55Xdx6676; Last   Rx:87Kwf5866 Ordered   5  Bumetanide 0 5 MG Oral Tablet; TAKE 1 TABLET ONCE DAILY; Therapy: (Ursula Cleaning) to Recorded   6  ClonazePAM 1 MG Oral Tablet; Therapy: (Recorded:02Mar2016) to Recorded   7  Doxepin HCl - 50 MG Oral Capsule; Therapy: (Recorded:02Mar2016) to Recorded   8  Farxiga 10 MG Oral Tablet; Take one tablet daily; Therapy: 90FWZ8792 to (Evaluate:54Pxh7613)  Requested for: 33Sjo2644; Last   Rx:92Shi6422; Status: ACTIVE - Transmit to [facility] Verification Ordered   9   Lantus SoloStar 100 UNIT/ML Subcutaneous Solution Pen-injector; Take 65 units daily; Therapy: (Recorded:26Oct2016) to  Requested for: 32AGU3786 Recorded   10  LORazepam TABS; Therapy: (Recorded:25Oct2016) to Recorded   11  MetFORMIN HCl - 1000 MG Oral Tablet; Take 1 tab BID; Therapy: (Recorded:23Dnj4391) to Recorded   12  Mirtazapine 15 MG Oral Tablet; Therapy: (Recorded:02Mar2016) to Recorded   13  NovoLOG FlexPen 100 UNIT/ML Subcutaneous Solution Pen-injector; 24 units before    breakfast 22 units before lunch  18 units before supper; Therapy: 93Hqp2612 to (Evaluate:04Icn4310)  Requested for: 26Oct2016; Last    Rx:26Oct2016 Ordered   14  RisperiDONE 1 MG Oral Tablet; TAKE 1 TABLET TWICE DAILY; Therapy: (Brielle Ayon) to Recorded   15  Valsartan-Hydrochlorothiazide 320-25 MG Oral Tablet; TAKE 1 TABLET DAILY     Requested for: 32Jfq1293; Last Rx:02Bkz0470 Ordered   16  Venlafaxine HCl ER 75 MG Oral Capsule Extended Release 24 Hour; Therapy: (Recorded:02Mar2016) to Recorded    Allergies    1   No Known Drug Allergies    Signatures   Electronically signed by : BEVERLY Cook; Oct 27 2016 10:45AM EST                       (Author)

## 2018-01-17 NOTE — PROGRESS NOTES
Discussion/Summary  Discussion Summary:   Norman Becerril present and provided interpretation Assess: Pt here for monthly weigh in  Pt gained 19 pounds since last appointment  Pt was recently seen by endocrinology and her insulin was increased  She was also seen by cardiology and needs to be more compliant with her statin medications  She did not keep a food log and has not been reading her bariatric booklet  Nutrition Diagnosis: Obesity related to sedentary lifestyle and excess energy intake as evidenced by BMI Intervention: Reviewed the plate method for decreasing calorie and carbohydrate  Provided with Japanese handout on the plate method for carbohydrate control from [facility]  Pt was receptive and verbalized understanding  Pt will work on the following goals: She will eat off of a 9 inch plate 3 meals plus one snack ( if snack is needed) She will make 1/2 her plate vegetables, then 1/4 plate lean protein and 1/4 plate starch Monitoring/evaluation: Pt will lose 2 to 4 pounds by next appointment Follow up scheduled for : 2 weeks with LCSW and RD  Active Problems    1  Anxiety (300 00) (F41 9)   2  Benign essential hypertension (401 1) (I10)   3  CKD stage G3b/A3, GFR 30-44 and albumin creatinine ratio >300 mg/g (585 3) (N18 3)   4  Depression (311) (F32 9)   5  GARCIA (dyspnea on exertion) (786 09) (R06 09)   6  Hypercholesterolemia (272 0) (E78 00)   7  Hyperlipidemia (272 4) (E78 5)   8  Hypertriglyceridemia (272 1) (E78 1)   9  Morbid obesity with BMI of 40 0-44 9, adult (278 01,V85 41) (E66 01,Z68 41)   10  Obstructive sleep apnea (327 23) (G47 33)   11  Palpitations (785 1) (R00 2)   12  Pre-procedural cardiovascular examination (V72 81) (Z01 810)   13  Type 2 diabetes mellitus with hyperglycemia (250 00) (E11 65)   14  Uncontrolled type 2 diabetes mellitus with stage 3 chronic kidney disease, with    long-term current use of insulin (250 52,585 3,V58 67) (E11 22,E11 65,N18 3,Z79  4)    Surgical History    1  History of Blood Transfusion (___ Ml)   2  History of Total Abdominal Hysterectomy    Family History  Mother    1  Family history of CAD, multiple vessel  Father    2  Family history of CAD, multiple vessel  Family History    3  Family history of essential hypertension (V17 49) (Z82 49)   4  Family history of Other specified diabetes mellitus without complications    Social History    · Current every day smoker (305 1) (F17 200)   · No alcohol use   · No caffeine use   · No drug use   · Recently stopped smoking    Current Meds   1  AmLODIPine Besylate 10 MG Oral Tablet; TAKE 1 TABLET DAILY AS DIRECTED; Therapy: (Recorded:58Ayt7190) to Recorded   2  Atorvastatin Calcium 40 MG Oral Tablet; TAKE 1 TABLET AT BEDTIME  Requested for:   11FUI3796; Last Rx:10Nov2016 Ordered   3  BD Insulin Syringe U-100 1 ML Miscellaneous; USE 1 DAILY; Therapy: 77CZK8052 to (Evaluate:24May2016)  Requested for: 97Ndd5433; Last   Rx:24Feb2016 Ordered   4  BD Pen Needle Mariana U/F 32G X 4 MM Miscellaneous; USE 3 TIMES A DAY; Therapy: 59GYI3259 to (Evaluate:57Skc7977)  Requested for: 00Nps2991; Last   Rx:24Feb2016 Ordered   5  ClonazePAM 1 MG Oral Tablet; Therapy: (Recorded:02Mar2016) to Recorded   6  Doxepin HCl - 50 MG Oral Capsule; Therapy: (Recorded:02Mar2016) to Recorded   7  Lantus SoloStar 100 UNIT/ML Subcutaneous Solution Pen-injector; Take 65 units daily; Therapy: (Christina Rothman) to  Requested for: 83COW1284 Recorded   8  LORazepam TABS; Therapy: (Recorded:25Oct2016) to Recorded   9  Mirtazapine 15 MG Oral Tablet; Therapy: (Recorded:02Mar2016) to Recorded   10  NovoLOG FlexPen 100 UNIT/ML Subcutaneous Solution Pen-injector; 24 units before    breakfast 22 units before lunch  18 units before supper; Therapy: 15Jfp9149 to (Evaluate:24Apr2017)  Requested for: 26Oct2016; Last    Rx:26Oct2016 Ordered   11  RisperiDONE 1 MG Oral Tablet; TAKE 1 TABLET TWICE DAILY; Therapy: (Recorded:42Fyb7169) to Recorded   12  Valsartan-Hydrochlorothiazide 320-25 MG Oral Tablet; TAKE 1 TABLET DAILY  Requested    for: 82Xxd9451; Last Rx:67Zqc7244 Ordered   13  Venlafaxine HCl ER 75 MG Oral Capsule Extended Release 24 Hour; Therapy: (Recorded:02Mar2016) to Recorded    Allergies    1  No Known Drug Allergies    Vitals  Signs   Recorded: 92GZC4905 12:14PM   Height: 5 ft 6 in  Weight: 265 lb   BMI Calculated: 42 77  BSA Calculated: 2 25    Future Appointments    Date/Time Provider Specialty Site   01/31/2017 11:10 AM CASIMIRO Panchal   Endocrinology [facility] ENDOCRINOLOGY   12/20/2016 11:00 AM Jose Lloyd DO Cardiology  CARDIOLOGY  [place]     Signatures   Electronically signed by : LIZBETH Oakley; Nov 14 2016 12:14PM EST                       (Author)    Electronically signed by : CASIMIRO Lr ; Nov 17 2016  3:08PM EST                       (Validation)
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
## 2018-01-18 NOTE — RESULT NOTES
Message   Diabetes remains uncontrolled  Medication changes made at the visit  Triglycerides are very high  Had fenofibrate 135 mg per day  Thyroid function testing is normal   There is significant kidney damage from the diabetes  Would recommend seeing Parrish Ziegler nephrology  Verified Results  (1) COMPREHENSIVE METABOLIC PANEL 99LRM3447 87:80KX [place] [facility] Order Number: [id number]  TW Order Number: [id number] Order Number: [id number] Patient Instructions: This is a fasting blood test  Water,black tea or black  coffee only after 9:00pm the night before test Drink 2 glasses of water the morning of test TW Order     Test Name Result Flag Reference   GLUCOSE,RANDM 211 mg/dL H    If the patient is fasting, the ADA then defines impaired fasting glucose as > 100 mg/dL and diabetes as > or equal to 123 mg/dL  SODIUM 136 mmol/L  136-145   POTASSIUM 4 2 mmol/L  3 5-5 3   CHLORIDE 100 mmol/L  100-108   CARBON DIOXIDE 28 mmol/L  21-32   ANION GAP (CALC) 8 mmol/L  4-13   BLOOD UREA NITROGEN 28 mg/dL H 5-25   CREATININE 1 55 mg/dL H 0 60-1 30   Standardized to IDMS reference method   CALCIUM 9 3 mg/dL  8 3-10 1   BILI, TOTAL 0 43 mg/dL  0 20-1 00   ALK PHOSPHATAS 148 U/L H    ALT (SGPT) 26 U/L  12-78   AST(SGOT) 20 U/L  5-45   ALBUMIN 2 8 g/dL L 3 5-5 0   TOTAL PROTEIN 6 7 g/dL  6 4-8 2   eGFR Non-African American 34 5 ml/min/1 73sq [facility] Energy Disease Education Program recommendations are as follows:  GFR calculation is accurate only with a steady state creatinine  Chronic Kidney disease less than 60 ml/min/1 73 sq  meters  Kidney failure less than 15 ml/min/1 73 sq  meters  (1) HEMOGLOBIN A1C 22Ain4294 04:25PM [place] [facility] Order Number: [id number]  TW Order Number: [id number]     Test Name Result Flag Reference   HEMOGLOBIN A1C 11 7 % H 4 2-6 3   EST  AVG   GLUCOSE 289 mg/dl       (1) LIPID PANEL, FASTING 90Cok1170 04:25PM Beth Stafford Order Number: [id number]  TW Order Number: [id number] Order Number: [id number]- Patient Instructions: This is a fasting blood test  Water,black tea or black  coffee only after 9:00pm the night before test Drink 2 glasses of water the morning of test TW Order     Test Name Result Flag Reference   CHOLESTEROL 201 mg/dL H    HDL,DIRECT 29 mg/dL L 40-60   Specimen collection should occur prior to Metamizole administration due to the potential for falsely depressed results  LDL CHOLESTEROL CALCULATED (Report)  0-100   Calculated LDL invalid, triglycerides >400 mg/dl   - Patient Instructions: This is a fasting blood test  Water,black tea or black coffee only after 9:00pm the night before test   Drink 2 glasses of water the morning of test     Triglyceride:       Normal       <150 mg/dl     Borderline High  150-199 mg/dl     High        200-499 mg/dl     Very High     >499 mg/dl  Cholesterol:       Desirable    <200 mg/dl    Borderline High 200-239 mg/dl    High       >239 mg/dl  HDL Cholesterol:      High  >59 mg/dL    Low   <41 mg/dL   Calculated LDL invalid, triglycerides >400 mg/dl   - Patient Instructions: This is a fasting blood test  Water,black tea or black  coffee only after 9:00pm the night before test   Drink 2 glasses of water the morning of test     Triglyceride:         Normal              <150 mg/dl       Borderline High    150-199 mg/dl       High               200-499 mg/dl       Very High          >499 mg/dl  Cholesterol:         Desirable        <200 mg/dl      Borderline High  200-239 mg/dl      High             >239 mg/dl  HDL Cholesterol:        High    >59 mg/dL      Low     <41 mg/dL   TRIGLYCERIDES 779 mg/dL H <=150   Specimen collection should occur prior to N-Acetylcysteine or Metamizole administration due to the potential for falsely depressed results       (1) TSH 08EPQ3190 04:25PM Ani Goyal    Order Number: [id number]   Order Number: [id number] Order Number: [id number] Patient Instructions: This is a fasting blood test  Water,black tea or black  coffee only after 9:00pm the night before test Drink 2 glasses of water the morning of test TW Order     Test Name Result Flag Reference   TSH 1 070 uIU/mL  0 358-3 740   - Patient Instructions: This bloodwork is non-fasting  Please drink two glasses of water morning of bloodwork  Patients undergoing fluorescein dye angiography may retain small amounts of fluorescein in the body for 48-72 hours post procedure  Samples containing fluorescein can produce falsely depressed TSH values  If the patient had this procedure,a specimen should be resubmitted post fluorescein clearance  The recommended reference ranges for TSH during pregnancy are as follows:  First trimester 0 1 to 2 5 uIU/mL  Second trimester  0 2 to 3 0 uIU/mL  Third trimester 0 3 to 3 0 uIU/m     (1) T4, FREE 30Tez2950 04:25PM María Trademobs Order Number: [id number]   Order Number: [id number] Order Number: [id number] Patient Instructions:  This is a fasting blood test  Water,black tea or black  coffee only after 9:00pm the night before test Drink 2 glasses of water the morning of test TW Order     Test Name Result Flag Reference   T4,FREE 0 92 ng/dL  0 76-1 46     (1) MICROALBUMIN CREATININE RATIO, RANDOM URINE 18Heo4735 04:25PM María Franciss Order Number: [id number]   Order Number: [id number]     Test Name Result Flag Reference   MICROALBUMIN/ CREAT R 3578 mg/g creatinine H 0-30   MICROALBUMIN,URINE 2190 0 mg/L H 0 0-20 0   CREATININE URINE 61 2 mg/dL

## 2018-03-07 NOTE — PROGRESS NOTES
Dear Tino Jimenez,  My name is Asif and I am a Registered Nurse and Care Coordinator for [address]  We recently spoke  on the phone regarding your hospital stay and you opted to receive follow-up phone calls from me  Because of the reason that you were in the hospital, Medicare has placed you in a program called [address]  One of the benefits  of the program is that you can have a nurse call regularly to answer any questions or concerns you may have  My phone number is listed below in case you would need to contact me        Sincerely,     Eleazar [address]  [phone]        Electronically signed by:Gloria Holguin RN  Dec 27 2016 11:42AM EST

## 2019-01-07 NOTE — RESULT NOTES
Can you please send these refills to mail order thanks Message    The A1C is high at 12 6 and Glucose is very high at 296  Insulin was increased at visit yesterday, but Please Send BG log for review ASAP so more changes can be made  *due to the kidney disease, she should discontinue metformin and farxiga  she will probably need even higher doses of insulin after this change, send BG log within 1 week         Verified Results  (1) HEMOGLOBIN A1C 25Oct2016 04:24PM Tasneem Ohm Order Number: [id number]     Test Name Result Flag Reference   HEMOGLOBIN A1C 12 6 % H 4 2-6 3   EST  AVG  GLUCOSE 315 mg/dl       (1) BASIC METABOLIC PROFILE 65UYC2027 04:24PM Tasneem [facility] Order Number: [id number]     Test Name Result Flag Reference   GLUCOSE,RANDM 296 mg/dL H    If the patient is fasting, the ADA then defines impaired fasting glucose as > 100 mg/dL and diabetes as > or equal to 123 mg/dL  SODIUM 135 mmol/L L 136-145   POTASSIUM 4 2 mmol/L  3 5-5 3   CHLORIDE 101 mmol/L  100-108   CARBON DIOXIDE 25 mmol/L  21-32   ANION GAP (CALC) 9 mmol/L  4-13   BLOOD UREA NITROGEN 30 mg/dL H 5-25   CREATININE 1 76 mg/dL H 0 60-1 30   Standardized to IDMS reference method   CALCIUM 9 6 mg/dL  8 3-10 1   eGFR Non-African American 29 7 ml/min/1 73sq m     [facility] Energy Disease Education Program recommendations are as follows:  GFR calculation is accurate only with a steady state creatinine  Chronic Kidney disease less than 60 ml/min/1 73 sq  meters  Kidney failure less than 15 ml/min/1 73 sq  meters